# Patient Record
Sex: FEMALE | NOT HISPANIC OR LATINO | Employment: OTHER | URBAN - NONMETROPOLITAN AREA
[De-identification: names, ages, dates, MRNs, and addresses within clinical notes are randomized per-mention and may not be internally consistent; named-entity substitution may affect disease eponyms.]

---

## 2017-02-13 ENCOUNTER — OFFICE VISIT - GICH (OUTPATIENT)
Dept: FAMILY MEDICINE | Facility: OTHER | Age: 62
End: 2017-02-13

## 2017-02-13 ENCOUNTER — HISTORY (OUTPATIENT)
Dept: FAMILY MEDICINE | Facility: OTHER | Age: 62
End: 2017-02-13

## 2017-02-13 DIAGNOSIS — R30.0 DYSURIA: ICD-10-CM

## 2017-02-13 DIAGNOSIS — N30.01 ACUTE CYSTITIS WITH HEMATURIA: ICD-10-CM

## 2017-02-13 LAB
BACTERIA URINE: NORMAL BACTERIA/HPF
BILIRUB UR QL: NEGATIVE
CLARITY, URINE: CLEAR CLARITY
COLOR UR: YELLOW COLOR
EPITHELIAL CELLS: NORMAL EPI/HPF
GLUCOSE URINE: NEGATIVE MG/DL
KETONES UR QL: NEGATIVE MG/DL
LEUKOCYTE ESTERASE URINE: NEGATIVE
NITRITE UR QL STRIP: NEGATIVE
OCCULT BLOOD,URINE - HISTORICAL: ABNORMAL
PH UR: 5 [PH]
PROTEIN QUALITATIVE,URINE - HISTORICAL: NEGATIVE MG/DL
RBC - HISTORICAL: NORMAL /HPF
SP GR UR STRIP: <=1.005
UROBILINOGEN,QUALITATIVE - HISTORICAL: NORMAL EU/DL
WBC - HISTORICAL: NORMAL /HPF

## 2018-01-03 NOTE — NURSING NOTE
Patient Information     Patient Name MRN Izzy Hooper [id number] Female 1955      Nursing Note by Theresa Church at 2017 12:00 PM     Author:  Theresa Church Service:  (none) Author Type:  NURS- Student Practical Nurse     Filed:  2017 12:35 PM Encounter Date:  2017 Status:  Signed     :  Theresa Church (NURS- Student Practical Nurse)            Patient presents with blood in urine starting 1 week ago. Patient states it itches and burns with urination. Theresa Church LPN .............2017  12:24 PM

## 2018-01-03 NOTE — PATIENT INSTRUCTIONS
Patient Information     Patient Name MRIzzy King [id number] Female 1955      Patient Instructions by Qian Null NP at 2017 12:00 PM     Author:  Qian Null NP Service:  (none) Author Type:  PHYS- Nurse Practitioner     Filed:  2017 12:39 PM Encounter Date:  2017 Status:  Signed     :  Qian Null NP (PHYS- Nurse Practitioner)            Bladder Infection   ________________________________________________________________________  KEY POINTS    A bladder infection, also called cystitis, is a type of urinary tract infection. Your urinary tract includes your kidneys, ureters, bladder, and urethra.    Your healthcare provider will likely prescribe an antibiotic medicine and medicine to help relieve burning and discomfort.    Follow the full course of treatment prescribed by your healthcare provider. If you are taking an antibiotic medicine, take all of it as prescribed, even if your symptoms are gone.  ________________________________________________________________________  What is a bladder infection?  A bladder infection, also called cystitis, is a type of urinary tract infection. The urinary tract includes your:    Kidneys, which make urine    Ureters, which are the tubes that carry urine from the kidneys to the bladder    Bladder, which stores urine    Urethra, which is the tube that drains urine from the bladder and out of the body  What is the cause?  Bladder infections are usually caused by bacteria. Normally there should be no bacteria in your urinary tract. Bacteria that cause infections in the urinary tract often spread from the rectum or vagina to the urethra and up into the bladder.  Bladder infections are more common in women because the urethra is short. The short urethra makes it easier for bacteria from the rectum or the genital area to reach the bladder. This can happen during sex. Young women often have bladder infections when they  have just started having sex. In older women, irritation and dryness of the vagina after menopause may increase the risk for bladder infections.  Bacteria may grow in the bladder if the flow of urine is blocked. For example, when a woman is pregnant, pressure from the baby can cause this problem. In men, an enlarged prostate may cause a blockage. Stones in the kidney or bladder can also cause blockage and infections.  If you have recently had a urinary catheter (for example, during surgery) or if you need to use a catheter every day, you are more likely to get bladder infections.  What are the symptoms?  The symptoms range from mild to severe. They may include:    Urinating more often    Feeling an urgent need to urinate or feeling that your bladder is always full    Pain or burning when you urinate    Pain in your lower belly, low back, or your side    Urine that smells bad    Urine that looks cloudy, reddish, or bloody    Leaking of urine  Fever and chills are more common with kidney infections, but can also happen with bladder infections.  How is it diagnosed?  Your healthcare provider will ask about your symptoms and medical history and examine you. You may have urine and blood tests.    If you have many bladder infections, you may need to have additional tests to see if there is a problem in the kidneys or urinary tract:    An intravenous pyelogram (IVP), which is a series of X-rays taken after your healthcare provider injects dye into your blood vessels to look for blockages in your kidneys and urinary tract    An ultrasound, which uses sound waves to show pictures of the kidneys and urinary tract  Men may have tests after just one infection because bladder infections are less common in men and may mean there are other problems.  How is it treated?  Your healthcare provider will likely prescribe an antibiotic and medicine to help relieve burning and discomfort. Prompt treatment of a bladder infection with  antibiotics usually relieves the symptoms in 1 to 2 days. If your infection has been causing symptoms for several days before treatment or if you have a fever, it may take longer to feel better.  It s important to get prompt treatment for bladder infections. If the infection is not treated, it can damage your kidneys and make you very sick. If the infection spreads to your blood, it can be life-threatening. If you are very sick, you may need to be in the hospital and get antibiotic medicine IV.  How can I take care of myself?    Drink plenty of water each day to flush your bladder and urinary tract unless your healthcare provider has told you to limit how much liquid you drink.    If you have a fever, ask your healthcare provider if you should take an NSAID or acetaminophen. Read the label and take as directed. Unless recommended by your healthcare provider, you should not take these medicines for more than 10 days.    Nonsteroidal anti-inflammatory medicines (NSAIDs), such as ibuprofen, naproxen, and aspirin, may cause stomach bleeding and other problems. These risks increase with age.    Acetaminophen may cause liver damage or other problems. Unless recommended by your provider, don't take more than 3000 milligrams (mg) in 24 hours. To make sure you don t take too much, check other medicines you take to see if they also contain acetaminophen. Ask your provider if you need to avoid drinking alcohol while taking this medicine.    Follow the full course of treatment prescribed by your healthcare provider. If you were prescribed an antibiotic medicine, take the antibiotics for as long as your healthcare provider prescribes, even if you feel better. If you stop taking the medicine too soon, you may not kill all of the bacteria and you may get sick again. If you have side effects from your medicine, talk to your healthcare provider.    Ask your provider:    How and when you will get your test results    How long it will  take to recover    If there are activities you should avoid and when you can return to your normal activities    How to take care of yourself at home    What symptoms or problems you should watch for and what to do if you have them    Make sure you know when you should come back for a checkup.  How can I help prevent bladder infection?  You may help prevent bladder infection if you:    Drink enough liquids to keep urine light yellow in color.    Drink a glass of cranberry juice each day. The juice should be real cranberry juice, not a cranberry-flavored drink.    Don t wait to go to the bathroom if you feel the need to urinate.    Practice safe sex:    Ask your healthcare provider which type of condom, diaphragm, or other birth control is right for you.    Urinate soon after sex.    Keep your genital area clean. If you want to have vaginal sex after anal sex, both partners should wash their genitals first.    Empty your bladder completely when you urinate.    Don t wear a wet bathing suit for long periods of time.  Also, if you are a woman:    If you often have bladder infections, keep a journal to see if they are related to sex. If they tend to happen after sex, your provider may prescribe medicine for you to take to help prevent infection.    Don t use irritating cosmetics or chemicals in your genital area. This includes, for example, strong soaps, feminine hygiene sprays, douches, scented tampons, sanitary napkins, or panty liners.    Keep your vaginal area clean. Wiping from front to back after using the toilet may help prevent infections. Use mild, unscented soap to wash your genital area gently each time you bathe or shower.    Wear underwear that is all cotton or has a cotton crotch. Pantyhose should also have a cotton crotch. Cotton absorbs moisture better than nylon. Change underwear and pantyhose every day.    During pregnancy, tell your healthcare provider if you have had urinary tract problems in the  past and let your provider know if you are having symptoms.    If you have reached menopause and are not taking estrogen, prescription estrogen vaginal cream may help prevent bladder infections.

## 2018-01-26 VITALS
HEART RATE: 60 BPM | TEMPERATURE: 97.2 F | BODY MASS INDEX: 31.83 KG/M2 | WEIGHT: 168.6 LBS | SYSTOLIC BLOOD PRESSURE: 145 MMHG | DIASTOLIC BLOOD PRESSURE: 82 MMHG | HEIGHT: 61 IN

## 2018-02-12 ENCOUNTER — DOCUMENTATION ONLY (OUTPATIENT)
Dept: FAMILY MEDICINE | Facility: OTHER | Age: 63
End: 2018-02-12

## 2018-05-05 ENCOUNTER — OFFICE VISIT (OUTPATIENT)
Dept: FAMILY MEDICINE | Facility: OTHER | Age: 63
End: 2018-05-05
Attending: FAMILY MEDICINE
Payer: COMMERCIAL

## 2018-05-05 VITALS
OXYGEN SATURATION: 96 % | SYSTOLIC BLOOD PRESSURE: 150 MMHG | HEIGHT: 62 IN | HEART RATE: 69 BPM | DIASTOLIC BLOOD PRESSURE: 102 MMHG | BODY MASS INDEX: 34.98 KG/M2 | WEIGHT: 190.1 LBS | RESPIRATION RATE: 16 BRPM

## 2018-05-05 DIAGNOSIS — S61.411A LACERATION OF RIGHT HAND, FOREIGN BODY PRESENCE UNSPECIFIED, INITIAL ENCOUNTER: Primary | ICD-10-CM

## 2018-05-05 PROCEDURE — G0463 HOSPITAL OUTPT CLINIC VISIT: HCPCS

## 2018-05-05 PROCEDURE — 12002 RPR S/N/AX/GEN/TRNK2.6-7.5CM: CPT | Performed by: FAMILY MEDICINE

## 2018-05-05 NOTE — PROGRESS NOTES
"  SUBJECTIVE:   Izzy Lopez is a 62 year old female who presents to clinic today for the following health issues: Laceration    HPI Comments: Patient arrives here for laceration.  She was scraping off paint the scraper slipped jamming into the base of her right wrist.  She is not complaining of any paresthesia or weakness in her hands.  She arrives here for sutures    Laceration           Patient Active Problem List    Diagnosis Date Noted     Diverticulosis of sigmoid colon 11/23/2015     Priority: Medium     Health care maintenance 11/17/2015     Priority: Medium     Obesity (BMI 30-39.9) 08/31/2015     Priority: Medium     Past Medical History:   Diagnosis Date     Hyperlipidemia     No Comments Provided      Past Surgical History:   Procedure Laterality Date     COLONOSCOPY      11/23/15,F/U 2025     HYSTERECTOMY TOTAL ABDOMINAL, BILATERAL SALPINGO-OOPHORECTOMY, COMBINED      No Comments Provided     LAPAROSCOPIC TUBAL LIGATION      No Comments Provided     TONSILLECTOMY      No Comments Provided     Social History     Social History Narrative    Preload  6/7/2013  Works doing private homecare.  Rides motorcycles.     No current outpatient prescriptions on file.     No Known Allergies    Review of Systems     OBJECTIVE:     BP (!) 150/102 (BP Location: Left arm, Patient Position: Sitting, Cuff Size: Adult Regular)  Pulse 69  Resp 16  Ht 5' 1.5\" (1.562 m)  Wt 190 lb 1.6 oz (86.2 kg)  SpO2 96%  Breastfeeding? No  BMI 35.34 kg/m2  Body mass index is 35.34 kg/(m^2).  Physical Exam   Constitutional: She appears well-developed.   Skin:   Patient has a laceration 3 cm in length at the base of her wrist.  This was gently and sterilely explored.  It does not extend down into any joint capsule or nerve.  I cannot visualize any tendons.       none     ASSESSMENT/PLAN:         1. Laceration of right hand, foreign body presence unspecified, initial encounter  Informed consent obtained.  Procedure and reason for " suture repair advised to patient.  Area was cleaned with both soaking and copious amounts of tap water.  About 5 minutes in total duration.  The area was then anesthetized with lidocaine.  Using 5 5-0 Ethilon sutures the laceration was closed.  Again prior to closing the wound was explored with a sterile Q-tip could not see any laceration into the joints.  Patient will have the sutures removed in 10 days.  - REPAIR SUPERFICIAL, WOUND BODY 2.6-7.5 CM      Isaiah Ley MD  [facility]

## 2018-05-05 NOTE — MR AVS SNAPSHOT
"              After Visit Summary   2018    Izzy Lopez    MRN: [id number]           Patient Information     Date Of Birth          1955        Visit Information        Provider Department      2018 11:45 AM Isaiah Ley MD [facility]        Today's Diagnoses     Laceration of right hand, foreign body presence unspecified, initial encounter    -  1       Follow-ups after your visit        Who to contact     If you have questions or need follow up information about today's clinic visit or your schedule please contact [facility] directly at [phone].  Normal or non-critical lab and imaging results will be communicated to you by [employer], letter or phone within 4 business days after the clinic has received the results. If you do not hear from us within 7 days, please contact the clinic through [employer] or phone. If you have a critical or abnormal lab result, we will notify you by phone as soon as possible.  Submit refill requests through [employer] or call your pharmacy and they will forward the refill request to us. Please allow 3 business days for your refill to be completed.          Additional Information About Your Visit        MyChart Information     [employer] lets you send messages to your doctor, view your test results, renew your prescriptions, schedule appointments and more. To sign up, go to [email or web address] . Click on \"Log in\" on the left side of the screen, which will take you to the Welcome page. Then click on \"Sign up Now\" on the right side of the page.     You will be asked to enter the access code listed below, as well as some personal information. Please follow the directions to create your username and password.     Your access code is: [email or web address]  Expires: 8/3/2018  2:06 PM     Your access code will  in 90 days. If you need help or a new code, please call your [place] clinic or [phone].        Care EveryWhere ID     " "This is your Care EveryWhere ID. This could be used by other organizations to access your [place] medical records  [id number]        Your Vitals Were     Pulse Respirations Height Pulse Oximetry Breastfeeding? BMI (Body Mass Index)    69 16 5' 1.5\" (1.562 m) 96% No 35.34 kg/m2       Blood Pressure from Last 3 Encounters:   05/05/18 (!) 150/102   02/13/17 145/82   09/08/15 126/66    Weight from Last 3 Encounters:   05/05/18 190 lb 1.6 oz (86.2 kg)   02/13/17 168 lb 9.6 oz (76.5 kg)   09/08/15 176 lb 9.6 oz (80.1 kg)              We Performed the Following     REPAIR SUPERFICIAL, WOUND BODY 2.6-7.5 CM        Primary Care Provider Fax #    Physician No Ref-Primary [phone]       No address on file        Equal Access to Services     [facility] : Hadii maurisio Alexander, waaxda lissette, qaybta kaalmada trip, jan coppola . So [facility] [phone].    ATENCIÓN: Si habla español, tiene a mcmillan disposición servicios gratuitos de asistencia lingüística. Llame al [phone].    We comply with applicable federal civil rights laws and [place] laws. We do not discriminate on the basis of race, color, national origin, age, disability, sex, sexual orientation, or gender identity.            Thank you!     Thank you for choosing [facility] AND [facility]  for your care. Our goal is always to provide you with excellent care. Hearing back from our patients is one way we can continue to improve our services. Please take a few minutes to complete the written survey that you may receive in the mail after your visit with us. Thank you!             Your Updated Medication List - Protect others around you: Learn how to safely use, store and throw away your medicines at www.disposemymeds.org.      Notice  As of 5/5/2018  2:06 PM    You have not been prescribed any medications.      "

## 2018-05-05 NOTE — NURSING NOTE
Patient present to clinic today with a laceration to her right wrist. DOI 5/5/18. Was cleaning her RV. And tool slipped and cut her.  Last Tdap was 9/30/14.  Calista Andrade CMA..............5/5/2018........12:26 PM

## 2018-05-05 NOTE — NURSING NOTE
The following medication was given:     MEDICATION: Lidocaine  ROUTE: SQ  SITE: Right Wrist  DOSE: 1ml  LOT #: [id number]  :  [employer]  EXPIRATION DATE:  02/01/2022  Calista Andrade CMA..............5/5/2018........3:18 PM      Prior to the start of the procedure and with procedural staff participation, I verbally confirmed the patient s identity using two indicators, relevant allergies, that the procedure was appropriate and matched the consent or emergent situation, and that the correct equipment/implants were available. Immediately prior to starting the procedure I conducted the Time Out with the procedural staff and re-confirmed the patient s name, procedure, and site/side. (The Joint Commission universal protocol was followed.)  Yes    Sedation (Moderate or Deep): None  Calista Andrade CMA..............5/5/2018........3:18 PM

## 2018-09-28 ENCOUNTER — OFFICE VISIT (OUTPATIENT)
Dept: FAMILY MEDICINE | Facility: OTHER | Age: 63
End: 2018-09-28
Attending: FAMILY MEDICINE
Payer: COMMERCIAL

## 2018-09-28 VITALS
BODY MASS INDEX: 32.08 KG/M2 | WEIGHT: 172.6 LBS | DIASTOLIC BLOOD PRESSURE: 78 MMHG | SYSTOLIC BLOOD PRESSURE: 136 MMHG | HEART RATE: 68 BPM

## 2018-09-28 DIAGNOSIS — L20.84 INTRINSIC ATOPIC DERMATITIS: Primary | ICD-10-CM

## 2018-09-28 DIAGNOSIS — L57.0 ACTINIC KERATOSIS: ICD-10-CM

## 2018-09-28 DIAGNOSIS — D23.5 BENIGN NEOPLASM OF SKIN OF TRUNK, EXCEPT SCROTUM: ICD-10-CM

## 2018-09-28 PROCEDURE — 17110 DESTRUCTION B9 LES UP TO 14: CPT | Performed by: FAMILY MEDICINE

## 2018-09-28 PROCEDURE — G0463 HOSPITAL OUTPT CLINIC VISIT: HCPCS

## 2018-09-28 PROCEDURE — 17000 DESTRUCT PREMALG LESION: CPT | Performed by: FAMILY MEDICINE

## 2018-09-28 PROCEDURE — 99213 OFFICE O/P EST LOW 20 MIN: CPT | Mod: 25 | Performed by: FAMILY MEDICINE

## 2018-09-28 PROCEDURE — G0463 HOSPITAL OUTPT CLINIC VISIT: HCPCS | Mod: 25

## 2018-09-28 RX ORDER — TRIAMCINOLONE ACETONIDE 5 MG/G
CREAM TOPICAL
Qty: 30 G | Refills: 3 | Status: SHIPPED | OUTPATIENT
Start: 2018-09-28 | End: 2019-04-11

## 2018-09-28 NOTE — PROGRESS NOTES
SUBJECTIVE:   Izzy Lopez is a 63 year old female who presents to clinic today for the following health issues: Rash    HPI Comments: Patient arrives here for rash on her back.  She also has couple lesions on her face one over her left eye went over her right.  Rash in the back is been going on for months.  Very itchy.  States that she is used over-the-counter Cortaid without any improvement.  She is also noticed some scaly areas on her face she would like looked at.  Patient would also like an ingrown toenail looked at        Patient Active Problem List    Diagnosis Date Noted     Intrinsic atopic dermatitis 2018     Priority: Medium     Diverticulosis of sigmoid colon 2015     Priority: Medium     Health care maintenance 2015     Priority: Medium     Obesity (BMI 30-39.9) 2015     Priority: Medium     Past Medical History:   Diagnosis Date     Hyperlipidemia     No Comments Provided      Past Surgical History:   Procedure Laterality Date     COLONOSCOPY      11/23/15,F/U      HYSTERECTOMY TOTAL ABDOMINAL, BILATERAL SALPINGO-OOPHORECTOMY, COMBINED      No Comments Provided     LAPAROSCOPIC TUBAL LIGATION      No Comments Provided     TONSILLECTOMY      No Comments Provided     Family History   Problem Relation Age of Onset     Cancer Mother 60     Cancer,lung     Other - See Comments Father 60     AAA, during repair     Other - See Comments Paternal Grandmother      GI Disease     Other - See Comments Paternal Grandfather      GI Disease     Breast Cancer Sister      Cancer-breast     Social History   Substance Use Topics     Smoking status: Never Smoker     Smokeless tobacco: Never Used     Alcohol use No       Review of Systems     OBJECTIVE:     /78 (BP Location: Right arm, Patient Position: Sitting)  Pulse 68  Wt 172 lb 9.6 oz (78.3 kg)  BMI 32.08 kg/m2  Body mass index is 32.08 kg/(m^2).  Physical Exam   Constitutional: She appears well-developed.    Musculoskeletal:   Ingrown toenail present on the right foot   Skin:   lichenification she also has 2 areas on the present on her backon.  Excoriations present.  She also has 2 areas present one area on above her right eye slightly lateral scaly the left lesion above her left eye on her upper lid slightly pigmented and oily in appearance       none     ASSESSMENT/PLAN:         1. Intrinsic atopic dermatitis    - triamcinolone (KENALOG) 0.5 % cream; Apply sparingly to affected area three times daily.  Dispense: 30 g; Refill: 3    2. Actinic keratosis    - DESTRUCT PREMALIGNANT LESION, FIRST    3. Benign neoplasm of skin of trunk, except scrotum  Also destroyed with cryo    Advised to follow-up concerning her ingrown toenail for removal      Isaiah Ley MD  [facility] AND \[facility]\""

## 2018-09-28 NOTE — MR AVS SNAPSHOT
After Visit Summary   9/28/2018    Izzy Lopez    MRN: [id number]           Patient Information     Date Of Birth          1955        Visit Information        Provider Department      9/28/2018 9:00 AM Isaiah Ley MD [facility]        Today's Diagnoses     Intrinsic atopic dermatitis    -  1    Actinic keratosis        Benign neoplasm of skin of trunk, except scrotum           Follow-ups after your visit        Your next 10 appointments already scheduled     Oct 04, 2018 11:15 AM CDT   Office Visit with Isaiah Ley MD,     [facility]    [address]  [place] [address] [phone]           Bring a current list of meds and any records pertaining to this visit. For Physicals, please bring immunization records and any forms needing to be filled out. Please arrive 10 minutes early to complete paperwork.              Who to contact     If you have questions or need follow up information about today's clinic visit or your schedule please contact [facility] directly at [phone].  Normal or non-critical lab and imaging results will be communicated to you by MyChart, letter or phone within 4 business days after the clinic has received the results. If you do not hear from us within 7 days, please contact the clinic through MyChart or phone. If you have a critical or abnormal lab result, we will notify you by phone as soon as possible.  Submit refill requests through [employer] or call your pharmacy and they will forward the refill request to us. Please allow 3 business days for your refill to be completed.          Additional Information About Your Visit        Care EveryWhere ID     This is your Care EveryWhere ID. This could be used by other organizations to access your [place] medical records  [id number]        Your Vitals Were     Pulse BMI (Body Mass Index)                 68 32.08 kg/m2           Blood Pressure from Last 3 Encounters:   09/28/18 136/78   05/05/18 (!) 150/102   02/13/17 145/82    Weight from Last 3 Encounters:   09/28/18 172 lb 9.6 oz (78.3 kg)   05/05/18 190 lb 1.6 oz (86.2 kg)   02/13/17 168 lb 9.6 oz (76.5 kg)              We Performed the Following     DESTRUCT PREMALIGNANT LESION, FIRST          Today's Medication Changes          These changes are accurate as of 9/28/18 10:31 AM.  If you have any questions, ask your nurse or doctor.               Start taking these medicines.        Dose/Directions    triamcinolone 0.5 % cream   Commonly known as:  KENALOG   Used for:  Intrinsic atopic dermatitis   Started by:  Isaiah Ley MD        Apply sparingly to affected area three times daily.   Quantity:  30 g   Refills:  3            Where to get your medicines      These medications were sent to [facility] [address] [place] [id number]     Phone:  [phone]     triamcinolone 0.5 % cream                Primary Care Provider Fax #    Physician No Ref-Primary [phone]       No address on file        Equal Access to Services     SUZANNE MENDEZ AH: Luis Angel gaineso Sobambi, waaxda luqadaha, qaybta kaalmada adeegyada, jan marshall. So [facility] [phone].    ATENCIÓN: Si habla español, tiene a mcmillan disposición servicios gratuitos de asistencia lingüística. Llame al [phone].    We comply with applicable federal civil rights laws and [place] laws. We do not discriminate on the basis of race, color, national origin, age, disability, sex, sexual orientation, or gender identity.            Thank you!     Thank you for choosing [facility] AND [facility]  for your care. Our goal is always to provide you with excellent care. Hearing back from our patients is one way we can continue to improve our services. Please take a few minutes to complete  the written survey that you may receive in the mail after your visit with us. Thank you!             Your Updated Medication List - Protect others around you: Learn how to safely use, store and throw away your medicines at www.disposemymeds.org.          This list is accurate as of 9/28/18 10:31 AM.  Always use your most recent med list.                   Brand Name Dispense Instructions for use Diagnosis    triamcinolone 0.5 % cream    KENALOG    30 g    Apply sparingly to affected area three times daily.    Intrinsic atopic dermatitis

## 2018-09-28 NOTE — NURSING NOTE
Patient here for rash on her back for the past 3 weeks that itches. Started out small and has grown, she has 3 dry spots on her face and an ingrown left toenail. Kary Warren LPN .......................9/28/2018  8:57 AM

## 2018-10-04 ENCOUNTER — OFFICE VISIT (OUTPATIENT)
Dept: FAMILY MEDICINE | Facility: OTHER | Age: 63
End: 2018-10-04
Attending: FAMILY MEDICINE
Payer: COMMERCIAL

## 2018-10-04 VITALS
BODY MASS INDEX: 31.97 KG/M2 | DIASTOLIC BLOOD PRESSURE: 60 MMHG | WEIGHT: 172 LBS | SYSTOLIC BLOOD PRESSURE: 94 MMHG | HEART RATE: 64 BPM

## 2018-10-04 DIAGNOSIS — L60.0 INGROWN TOENAIL: Primary | ICD-10-CM

## 2018-10-04 PROCEDURE — 11730 AVULSION NAIL PLATE SIMPLE 1: CPT | Performed by: FAMILY MEDICINE

## 2018-10-04 PROCEDURE — G0463 HOSPITAL OUTPT CLINIC VISIT: HCPCS

## 2018-10-04 ASSESSMENT — PAIN SCALES - GENERAL: PAINLEVEL: NO PAIN (0)

## 2018-10-04 NOTE — MR AVS SNAPSHOT
After Visit Summary   10/4/2018    Izzy Lopez    MRN: [id number]           Patient Information     Date Of Birth          1955        Visit Information        Provider Department      10/4/2018 11:15 AM Isaiah Ley MD; [facility] [address] [facility]        Today's Diagnoses     Ingrown toenail    -  1       Follow-ups after your visit        Who to contact     If you have questions or need follow up information about today's clinic visit or your schedule please contact [facility] directly at [phone].  Normal or non-critical lab and imaging results will be communicated to you by MyChart, letter or phone within 4 business days after the clinic has received the results. If you do not hear from us within 7 days, please contact the clinic through MyChart or phone. If you have a critical or abnormal lab result, we will notify you by phone as soon as possible.  Submit refill requests through [employer] or call your pharmacy and they will forward the refill request to us. Please allow 3 business days for your refill to be completed.          Additional Information About Your Visit        Care EveryWhere ID     This is your Care EveryWhere ID. This could be used by other organizations to access your [facility] medical records  [id number]        Your Vitals Were     Pulse BMI (Body Mass Index)                64 31.97 kg/m2           Blood Pressure from Last 3 Encounters:   10/04/18 94/60   09/28/18 136/78   05/05/18 (!) 150/102    Weight from Last 3 Encounters:   10/04/18 172 lb (78 kg)   09/28/18 172 lb 9.6 oz (78.3 kg)   05/05/18 190 lb 1.6 oz (86.2 kg)              We Performed the Following     REMOVAL OF NAIL PLATE SIMPLE SINGLE        Primary Care Provider Fax #    Physician No Ref-Primary [phone]       No address on file        Equal Access to Services     SUZANNE MENDEZ : eliza Mireles qaybta kaalmada adeegyada, waxay  cynthia chavarrianarendra cassidy'aan ah. So [facility] [phone].    ATENCIÓN: Si mattla delmer, tiene a mcmillan disposición servicios gratuitos de asistencia lingüística. Nicola al [phone].    We comply with applicable federal civil rights laws and [place] laws. We do not discriminate on the basis of race, color, national origin, age, disability, sex, sexual orientation, or gender identity.            Thank you!     Thank you for choosing [facility] AND [facility]  for your care. Our goal is always to provide you with excellent care. Hearing back from our patients is one way we can continue to improve our services. Please take a few minutes to complete the written survey that you may receive in the mail after your visit with us. Thank you!             Your Updated Medication List - Protect others around you: Learn how to safely use, store and throw away your medicines at www.disposemymeds.org.          This list is accurate as of 10/4/18 11:47 AM.  Always use your most recent med list.                   Brand Name Dispense Instructions for use Diagnosis    triamcinolone 0.5 % cream    KENALOG    30 g    Apply sparingly to affected area three times daily.    Intrinsic atopic dermatitis

## 2018-10-04 NOTE — NURSING NOTE
Patient here for left great toenail removal.  Kary Warren LPN .......................10/4/2018  10:58 AM

## 2018-10-04 NOTE — PROGRESS NOTES
SUBJECTIVE:   Izzy Lopez is a 63 year old female who presents to clinic today for the following health issues: Ingrown toenail    HPI Comments: Patient arrives here for ingrown toenail removal.  She was recently seen and this was brought up.  She was advised to follow-up.         Patient Active Problem List    Diagnosis Date Noted     Ingrown toenail 10/04/2018     Priority: Medium     Intrinsic atopic dermatitis 09/28/2018     Priority: Medium     Actinic keratosis 09/28/2018     Priority: Medium     Benign neoplasm of skin of trunk, except scrotum 09/28/2018     Priority: Medium     Diverticulosis of sigmoid colon 11/23/2015     Priority: Medium     Health care maintenance 11/17/2015     Priority: Medium     Obesity (BMI 30-39.9) 08/31/2015     Priority: Medium     Past Medical History:   Diagnosis Date     Hyperlipidemia     No Comments Provided      Past Surgical History:   Procedure Laterality Date     COLONOSCOPY      11/23/15,F/U 2025     HYSTERECTOMY TOTAL ABDOMINAL, BILATERAL SALPINGO-OOPHORECTOMY, COMBINED      No Comments Provided     LAPAROSCOPIC TUBAL LIGATION      No Comments Provided     TONSILLECTOMY      No Comments Provided     Current Outpatient Prescriptions   Medication Sig Dispense Refill     triamcinolone (KENALOG) 0.5 % cream Apply sparingly to affected area three times daily. 30 g 3     No Known Allergies    Review of Systems     OBJECTIVE:     BP 94/60 (BP Location: Right arm, Patient Position: Sitting)  Pulse 64  Wt 172 lb (78 kg)  BMI 31.97 kg/m2  Body mass index is 31.97 kg/(m^2).  Physical Exam   Constitutional: She appears well-developed and well-nourished.   Musculoskeletal: Normal range of motion.   Ingrown toenail on the left first digit.  No secondary infection   Psychiatric: She has a normal mood and affect.       none     ASSESSMENT/PLAN:         1. Ingrown toenail left    - REMOVAL OF NAIL PLATE SIMPLE SINGLE    Informed consent obtained.  Form is filled out.   Patient's identity confirmed procedure confirmed.  Patient is agreeable.  Area was prepped with Hibiclens.  Using a digital block with 1% lidocaine without epi approximately 3 cc were injected into the using a [place] blade of the ingrown portion was excised left great toe..   was used and the nail was grabbed by a forcep.  Phenyl was applied x2 neutralized with alcohol x3.  Patient tolerated it well.  Follow-up as needed      Isaiah Ley MD  [facility] AND [facility]

## 2019-04-11 ENCOUNTER — OFFICE VISIT (OUTPATIENT)
Dept: FAMILY MEDICINE | Facility: OTHER | Age: 64
End: 2019-04-11
Attending: FAMILY MEDICINE
Payer: COMMERCIAL

## 2019-04-11 ENCOUNTER — HOSPITAL ENCOUNTER (OUTPATIENT)
Dept: GENERAL RADIOLOGY | Facility: OTHER | Age: 64
Discharge: HOME | End: 2019-04-11
Attending: FAMILY MEDICINE | Admitting: FAMILY MEDICINE
Payer: COMMERCIAL

## 2019-04-11 VITALS
SYSTOLIC BLOOD PRESSURE: 128 MMHG | DIASTOLIC BLOOD PRESSURE: 74 MMHG | BODY MASS INDEX: 32.64 KG/M2 | TEMPERATURE: 97.7 F | WEIGHT: 175.6 LBS | RESPIRATION RATE: 20 BRPM | HEART RATE: 76 BPM

## 2019-04-11 DIAGNOSIS — M54.41 CHRONIC BILATERAL LOW BACK PAIN WITH RIGHT-SIDED SCIATICA: ICD-10-CM

## 2019-04-11 DIAGNOSIS — M54.41 CHRONIC BILATERAL LOW BACK PAIN WITH RIGHT-SIDED SCIATICA: Primary | ICD-10-CM

## 2019-04-11 DIAGNOSIS — G89.29 CHRONIC BILATERAL LOW BACK PAIN WITH RIGHT-SIDED SCIATICA: ICD-10-CM

## 2019-04-11 DIAGNOSIS — G89.29 CHRONIC BILATERAL LOW BACK PAIN WITH RIGHT-SIDED SCIATICA: Primary | ICD-10-CM

## 2019-04-11 PROBLEM — M54.40 CHRONIC BILATERAL LOW BACK PAIN WITH SCIATICA: Status: ACTIVE | Noted: 2019-04-11

## 2019-04-11 PROBLEM — M54.42 CHRONIC BILATERAL LOW BACK PAIN WITH SCIATICA: Status: ACTIVE | Noted: 2019-04-11

## 2019-04-11 LAB
ANION GAP SERPL CALCULATED.3IONS-SCNC: 3 MMOL/L (ref 3–14)
BUN SERPL-MCNC: 24 MG/DL (ref 7–25)
CALCIUM SERPL-MCNC: 9.9 MG/DL (ref 8.6–10.3)
CHLORIDE SERPL-SCNC: 109 MMOL/L (ref 98–107)
CO2 SERPL-SCNC: 26 MMOL/L (ref 21–31)
CREAT SERPL-MCNC: 0.92 MG/DL (ref 0.6–1.2)
GFR SERPL CREATININE-BSD FRML MDRD: 61 ML/MIN/{1.73_M2}
GLUCOSE SERPL-MCNC: 98 MG/DL (ref 70–105)
POTASSIUM SERPL-SCNC: 4.5 MMOL/L (ref 3.5–5.1)
SODIUM SERPL-SCNC: 138 MMOL/L (ref 134–144)

## 2019-04-11 PROCEDURE — G0463 HOSPITAL OUTPT CLINIC VISIT: HCPCS | Mod: 25

## 2019-04-11 PROCEDURE — 80048 BASIC METABOLIC PNL TOTAL CA: CPT | Performed by: FAMILY MEDICINE

## 2019-04-11 PROCEDURE — G0463 HOSPITAL OUTPT CLINIC VISIT: HCPCS

## 2019-04-11 PROCEDURE — 72100 X-RAY EXAM L-S SPINE 2/3 VWS: CPT

## 2019-04-11 PROCEDURE — 99213 OFFICE O/P EST LOW 20 MIN: CPT | Performed by: FAMILY MEDICINE

## 2019-04-11 PROCEDURE — 36415 COLL VENOUS BLD VENIPUNCTURE: CPT | Performed by: FAMILY MEDICINE

## 2019-04-11 RX ORDER — MELOXICAM 15 MG/1
15 TABLET ORAL DAILY
Qty: 30 TABLET | Refills: 3 | Status: SHIPPED | OUTPATIENT
Start: 2019-04-11 | End: 2019-08-12

## 2019-04-11 ASSESSMENT — PATIENT HEALTH QUESTIONNAIRE - PHQ9: SUM OF ALL RESPONSES TO PHQ QUESTIONS 1-9: 0

## 2019-04-11 ASSESSMENT — PAIN SCALES - GENERAL: PAINLEVEL: SEVERE PAIN (7)

## 2019-04-11 NOTE — NURSING NOTE
Patient here for low back pain that started 2 months ago that radiated around to the right groin area. Medication Reconciliation: complete.    Kary Warren LPN  4/11/2019 8:22 AM

## 2019-04-11 NOTE — LETTER
April 12, 2019      Izzy Lopez  [address] [address] [address]        Dear ,    We are writing to inform you of your test results.    Your test results fall within the expected range(s) or remain unchanged from previous results.  Please continue with current treatment plan.    Resulted Orders   Basic Metabolic Panel   Result Value Ref Range    Sodium 138 134 - 144 mmol/L    Potassium 4.5 3.5 - 5.1 mmol/L    Chloride 109 (H) 98 - 107 mmol/L    Carbon Dioxide 26 21 - 31 mmol/L    Anion Gap 3 3 - 14 mmol/L    Glucose 98 70 - 105 mg/dL    Urea Nitrogen 24 7 - 25 mg/dL    Creatinine 0.92 0.60 - 1.20 mg/dL    GFR Estimate 61 >60 mL/min/[1.73_m2]    GFR Estimate If Black 74 >60 mL/min/[1.73_m2]    Calcium 9.9 8.6 - 10.3 mg/dL       If you have any questions or concerns, please call the clinic at the number listed above.       Sincerely,        Isaiah Ley MD

## 2019-04-12 NOTE — PROGRESS NOTES
SUBJECTIVE:   Izzy Lopez is a 63 year old female who presents to clinic today for the following health issues: 2-month history of back pain    Patient arrives here for back pain.  States is been going on for 2 months.  He radiates into the groin from the lower back.  She is been told in the past she has arthritis of the back.  She has no history of injury.  This has occurred in the past but states that typically it resolves on its own.  This is been lasting for 2 months.  She rates it as a 7 out of 10.  She has been taken ibuprofen ice and heat without any improvement.  Also reports it goes down into the right buttocks and occasionally down the legs.  She denies any weakness in the legs.  The pain is mainly anterior.        Patient Active Problem List    Diagnosis Date Noted     Chronic bilateral low back pain with sciatica 04/11/2019     Priority: Medium     Ingrown toenail 10/04/2018     Priority: Medium     Intrinsic atopic dermatitis 09/28/2018     Priority: Medium     Actinic keratosis 09/28/2018     Priority: Medium     Benign neoplasm of skin of trunk, except scrotum 09/28/2018     Priority: Medium     Diverticulosis of sigmoid colon 11/23/2015     Priority: Medium     Health care maintenance 11/17/2015     Priority: Medium     Obesity (BMI 30-39.9) 08/31/2015     Priority: Medium     Past Medical History:   Diagnosis Date     Hyperlipidemia     No Comments Provided      Past Surgical History:   Procedure Laterality Date     COLONOSCOPY  11/23/2015    11/23/15,F/U 2025     HYSTERECTOMY TOTAL ABDOMINAL, BILATERAL SALPINGO-OOPHORECTOMY, COMBINED      No Comments Provided     LAPAROSCOPIC TUBAL LIGATION      No Comments Provided     TONSILLECTOMY      No Comments Provided     Current Outpatient Medications   Medication Sig Dispense Refill     meloxicam (MOBIC) 15 MG tablet Take 1 tablet (15 mg) by mouth daily 30 tablet 3     No Known Allergies    Review of Systems     OBJECTIVE:     /74 (BP  Location: Right arm)   Pulse 76   Temp 97.7  F (36.5  C)   Resp 20   Wt 79.7 kg (175 lb 9.6 oz)   BMI 32.64 kg/m    Body mass index is 32.64 kg/m .  Physical Exam   Constitutional: She appears well-developed.   HENT:   Head: Normocephalic.   Musculoskeletal: Normal range of motion. She exhibits no deformity.   Neurological: She displays normal reflexes. She exhibits normal muscle tone. Coordination normal.   Patient is able to stand on her toes and heels squats without difficulties.   Skin: Skin is warm.       Diagnostic Test Results:  Results for orders placed or performed in visit on 04/11/19   Basic Metabolic Panel   Result Value Ref Range    Sodium 138 134 - 144 mmol/L    Potassium 4.5 3.5 - 5.1 mmol/L    Chloride 109 (H) 98 - 107 mmol/L    Carbon Dioxide 26 21 - 31 mmol/L    Anion Gap 3 3 - 14 mmol/L    Glucose 98 70 - 105 mg/dL    Urea Nitrogen 24 7 - 25 mg/dL    Creatinine 0.92 0.60 - 1.20 mg/dL    GFR Estimate 61 >60 mL/min/[1.73_m2]    GFR Estimate If Black 74 >60 mL/min/[1.73_m2]    Calcium 9.9 8.6 - 10.3 mg/dL         ASSESSMENT/PLAN:         1. Chronic bilateral low back pain with right-sided sciatica  X-rays do show scoliosis and mild degenerative changes.  Patient has satisfactory kidney function.  We will start her on Mobic.  If no improvement consider physical therapy.  Also had a long discussion with the patient about pursuing other modalities including injection which would need an MRI.  We will treat conservative for now.  - XR Lumbar Spine 2/3 Views; Future  - meloxicam (MOBIC) 15 MG tablet; Take 1 tablet (15 mg) by mouth daily  Dispense: 30 tablet; Refill: 3  - Basic Metabolic Panel; Future  - Basic Metabolic Panel      Isaiah Ley MD  [facility]

## 2019-08-12 DIAGNOSIS — G89.29 CHRONIC BILATERAL LOW BACK PAIN WITH RIGHT-SIDED SCIATICA: Primary | ICD-10-CM

## 2019-08-12 DIAGNOSIS — M54.41 CHRONIC BILATERAL LOW BACK PAIN WITH RIGHT-SIDED SCIATICA: Primary | ICD-10-CM

## 2019-08-14 RX ORDER — MELOXICAM 15 MG/1
TABLET ORAL
Qty: 30 TABLET | Refills: 5 | Status: SHIPPED | OUTPATIENT
Start: 2019-08-14 | End: 2022-12-14

## 2019-08-14 NOTE — TELEPHONE ENCOUNTER
[facility] sent Rx request for the following:      MELOXICAM 15 MG TABLET   Sig: TAKE 1 TABLET BY MOUTH ONCE DAILY  Last Prescription Date:   4/11/19  Last Fill Qty/Refills:         30, R-3    Last Office Visit:              4/11/19  Future Office visit:           None.    NSAID Medications Failed8/14 11:19 AM   Normal ALT on file in past 12 months    Normal AST on file in past 12 months    Patient is age 6-64 years    Normal CBC on file in past 12 months     Per LOV Note:  We will start her on Mobic.  If no improvement consider physical therapy.  Also had a long discussion with the patient about pursuing other modalities including injection which would need an MRI.  We will treat conservative for now.    Attempted reaching Patient for more information. Left message on machine to call back. Mariam Luicano RN .............. 8/14/2019  12:23 PM

## 2019-08-14 NOTE — TELEPHONE ENCOUNTER
"Patient returned call and her last name and  were verified. Pt states, \"If I forget to take this for a couple of days, I am in real pain. As long as I take it, I am pain free. It is working great for me. I don't even want to know if there are side effects.\"    Pt notes she just turned 64 and only has medicare (without supplement). She cannot afford to come in for follow-up appointment at this time.    Routing to PCP for refill consideration. Mariam Luciano RN .............. 2019  12:39 PM        "

## 2020-05-04 NOTE — PROGRESS NOTES
MEDICARE WELLNESS VISIT NOTE      HISTORY OF PRESENT ILLNESS:   Viola Jose presents for her Subsequent Annual Medicare Wellness Visit.   She has no current complaints or concerns.     Needs meds refilled.   Doing well. No concerns.  Mood is good. No depression or anxiety.  Tolerating pravastatin without side effects.    She is due for annual lab work.     Patient Care Team:  Abbi Gutierres DO as PCP - General (Family Practice)  jonatan (Dentist - General Practice)  Mo Lion MD (Psychiatry)  Maximiliano Nguyen MD (Dermatology - [facility]-Micrographic Surgery)  Hamilton Azar MD (Obstetrics & Gynecology)        Patient Active Problem List   Diagnosis   • Other and unspecified hyperlipidemia   • Palpitations   • Wrist joint pain   • Enthesopathy of hip region   • Depression         Past Medical History:   Diagnosis Date   • Colon polyps    • Depressive disorder    • Hx of migraines    • Malignant neoplasm (CMS/HCC)     basil cell   • Other and unspecified hyperlipidemia          Past Surgical History:   Procedure Laterality Date   • Eye surgery      lasix   • Incise finger tendon sheath  03/13/2008   • Service to gastroenterology      colonoscopy   • Skin biopsy     • [facility] tooth extraction           Social History     Tobacco Use   • Smoking status: Never Smoker   • Smokeless tobacco: Never Used   Substance Use Topics   • Alcohol use: No     Comment: rarely   • Drug use: No     Drug use:    Drug Use:    No              Family History - Reviewed    Current Outpatient Medications   Medication Sig Dispense Refill   • sertraline (ZOLOFT) 100 MG tablet Take 1 tablet by mouth daily. 90 tablet 3   • pravastatin (PRAVACHOL) 40 MG tablet Take 1 tablet by mouth at bedtime. 90 tablet 3   • Calcium Carbonate-Vitamin D (CALCIUM 600+D) 600-200 MG-UNIT TABS Take 1 tablet by mouth 3 times daily.       No current facility-administered medications for this visit.         The following items on the Medicare Health Risk  "Patient Information     Patient Name MRN Izzy Hooper [id number] Female 1955      Progress Notes by Qian Null NP at 2017 12:00 PM     Author:  Qian Null NP Service:  (none) Author Type:  PHYS- Nurse Practitioner     Filed:  2017  4:26 PM Encounter Date:  2017 Status:  Signed     :  Qian Null NP (PHYS- Nurse Practitioner)            Nursing Notes:   Theresa Church  2017 12:35 PM  Signed  Patient presents with blood in urine starting 1 week ago. Patient states it itches and burns with urination. Theresa Church LPN .............2017  12:24 PM    SUBJECTIVE:    Izzy DUNCAN is a 61 y.o. female who presents for UTI s/s    Dysuria    This is a new problem. The current episode started in the past 7 days. The problem occurs every urination. The problem has been unchanged. The quality of the pain is described as burning, stabbing and aching. There has been no fever. She is sexually active. There is no history of pyelonephritis. Associated symptoms include frequency, hematuria and urgency. Pertinent negatives include no chills, discharge, flank pain, hesitancy, nausea, possible pregnancy, sweats or vomiting. She has tried increased fluids for the symptoms. The treatment provided mild relief. There is no history of catheterization, kidney stones, recurrent UTIs, a single kidney, urinary stasis or a urological procedure.       No current outpatient prescriptions on file prior to visit.     No current facility-administered medications on file prior to visit.        REVIEW OF SYSTEMS:  Review of Systems   Constitutional: Negative for chills.   Gastrointestinal: Negative for nausea and vomiting.   Genitourinary: Positive for dysuria, frequency, hematuria and urgency. Negative for flank pain and hesitancy.       OBJECTIVE:  /82  Pulse 60  Temp 97.2  F (36.2  C) (Temporal)  Ht 1.55 m (5' 1.02\")  Wt 76.5 kg (168 lb 9.6 oz)  " Assessment were found to be positive  6 c.) How many servings of Fried or High Fat Foods do you have each day (1 serving = 1 Lara, French Fries, chips, doughnut, fried chicken/fish):  2 per day         Vision and Hearing screens: not performed    Advance Directive:   The patient has the following documents:  Power of  for Health Care    Cognitive Assessment: no evidence of cognitive dysfunction by direct observation    Recent PHQ 2/9 Score    PHQ 2:  Date Adult PHQ 2 Score   4/12/2019 0       PHQ 9:  Date Adult PHQ 9 Score   2/6/2018 0       DEPRESSION ASSESSMENT/PLAN:  Depression screening is negative no further plan needed.     Body mass index is 24.07 kg/m².    BMI ASSESSMENT/PLAN:  Patient BMI is within normal range.     Needed Screening/Treatment:   Cardiovascular screening - Lipids , Diabetes screening  and Immunizations reviewed and patient needs: Herpes Zoster  Needed follow up:  None    See orders.   See Patient Instructions section.   Return in about 1 year (around 5/4/2021) for Medicare Wellness Visit.   Breastfeeding? No  BMI 31.83 kg/m2    EXAM:   Physical Exam   Constitutional: She is well-developed, well-nourished, and in no distress.   HENT:   Head: Normocephalic and atraumatic.   Eyes: Conjunctivae are normal.   Cardiovascular: Normal rate.    Pulmonary/Chest: Effort normal. No respiratory distress.   Abdominal: Soft. Bowel sounds are normal. She exhibits no distension. There is no hepatosplenomegaly. There is tenderness in the suprapubic area. There is no rebound, no guarding and no CVA tenderness.   Nursing note and vitals reviewed.    Results for orders placed or performed in visit on 02/13/17      URINALYSIS W REFLEX MICROSCOPIC IF POSITIVE      Result  Value Ref Range    COLOR                     Yellow Yellow Color    CLARITY                   Clear Clear Clarity    SPECIFIC GRAVITY,URINE    <=1.005 (A) 1.010, 1.015, 1.020, 1.025                    PH,URINE                  5.0 (A) 6.0, 7.0, 8.0, 5.5, 6.5, 7.5, 8.5                    UROBILINOGEN,QUALITATIVE  Normal Normal EU/dl    PROTEIN, URINE Negative Negative mg/dL    GLUCOSE, URINE Negative Negative mg/dL    KETONES,URINE             Negative Negative mg/dL    BILIRUBIN,URINE           Negative Negative                    OCCULT BLOOD,URINE        Trace (A) Negative                    NITRITE                   Negative Negative                    LEUKOCYTE ESTERASE        Negative Negative                   URINALYSIS MICROSCOPIC      Result  Value Ref Range    RBC 0-2 0-2, None Seen /HPF    WBC None Seen 0-2, 3-5, None Seen /HPF    BACTERIA                  None Seen None Seen, Rare, Occasional, Few Bacteria/HPF    EPITHELIAL CELLS          None Seen None Seen, Few Epi/HPF     Completed labs at today's visit U/A.  I personally reviewed the labs with the patient/parent at the visit. Abnormalities include Dilute U/A, trace blood.     ASSESSMENT/PLAN:    ICD-10-CM    1. Dysuria R30.0 URINALYSIS W REFLEX MICROSCOPIC IF POSITIVE      URINALYSIS W  REFLEX MICROSCOPIC IF POSITIVE      URINALYSIS MICROSCOPIC      URINALYSIS MICROSCOPIC   2. Acute cystitis with hematuria N30.01 nitrofurantoin macrocrystals/monohydrate (MACROBID) 100 mg capsule        Plan:  Based on Clinic s/s and exam will treat as clinical UTI. I explained my diagnostic considerations and recommendations to the patient, who voiced understanding and agreement with the treatment plan. All questions were answered. We discussed potential side effects of any prescribed or recommended therapies, as well as expectations for response to treatments. She was advised to contact our office if there is no improvement or worsening of conditions or symptoms.  If s/s worsen or persist, patient will either come back or follow up with PCP.       MARIANNE GARCIA NP ....................  2/13/2017   4:26 PM

## 2021-04-23 ENCOUNTER — IMMUNIZATION (OUTPATIENT)
Dept: FAMILY MEDICINE | Facility: OTHER | Age: 66
End: 2021-04-23
Attending: FAMILY MEDICINE
Payer: MEDICARE

## 2021-04-23 PROCEDURE — 91300 PR COVID VAC PFIZER DIL RECON 30 MCG/0.3 ML IM: CPT

## 2021-05-14 ENCOUNTER — IMMUNIZATION (OUTPATIENT)
Dept: FAMILY MEDICINE | Facility: OTHER | Age: 66
End: 2021-05-14
Attending: FAMILY MEDICINE
Payer: MEDICARE

## 2021-05-14 PROCEDURE — 91300 PR COVID VAC PFIZER DIL RECON 30 MCG/0.3 ML IM: CPT

## 2022-12-14 ENCOUNTER — OFFICE VISIT (OUTPATIENT)
Dept: FAMILY MEDICINE | Facility: OTHER | Age: 67
End: 2022-12-14
Attending: FAMILY MEDICINE
Payer: MEDICARE

## 2022-12-14 ENCOUNTER — HOSPITAL ENCOUNTER (OUTPATIENT)
Dept: GENERAL RADIOLOGY | Facility: OTHER | Age: 67
Discharge: HOME | End: 2022-12-14
Attending: FAMILY MEDICINE
Payer: MEDICARE

## 2022-12-14 VITALS
DIASTOLIC BLOOD PRESSURE: 84 MMHG | SYSTOLIC BLOOD PRESSURE: 120 MMHG | BODY MASS INDEX: 34.69 KG/M2 | RESPIRATION RATE: 18 BRPM | WEIGHT: 186.6 LBS | HEART RATE: 75 BPM | OXYGEN SATURATION: 96 % | TEMPERATURE: 97.6 F

## 2022-12-14 DIAGNOSIS — Z13.6 SCREENING FOR CARDIOVASCULAR CONDITION: ICD-10-CM

## 2022-12-14 DIAGNOSIS — R06.02 SOB (SHORTNESS OF BREATH): ICD-10-CM

## 2022-12-14 DIAGNOSIS — Z82.49 FAMILY HISTORY OF ABDOMINAL AORTIC ANEURYSM (AAA): ICD-10-CM

## 2022-12-14 DIAGNOSIS — L82.0 INFLAMED SEBORRHEIC KERATOSIS: ICD-10-CM

## 2022-12-14 DIAGNOSIS — Z12.31 ENCOUNTER FOR SCREENING MAMMOGRAM FOR MALIGNANT NEOPLASM OF BREAST: ICD-10-CM

## 2022-12-14 DIAGNOSIS — Z12.31 ENCOUNTER FOR SCREENING MAMMOGRAM FOR BREAST CANCER: ICD-10-CM

## 2022-12-14 DIAGNOSIS — E78.00 HYPERCHOLESTEREMIA: ICD-10-CM

## 2022-12-14 DIAGNOSIS — I70.0 ATHEROSCLEROSIS OF AORTA (H): ICD-10-CM

## 2022-12-14 DIAGNOSIS — R07.89 ATYPICAL CHEST PAIN: Primary | ICD-10-CM

## 2022-12-14 LAB
ALBUMIN SERPL BCG-MCNC: 4.5 G/DL (ref 3.5–5.2)
ALP SERPL-CCNC: 98 U/L (ref 35–104)
ALT SERPL W P-5'-P-CCNC: 24 U/L (ref 10–35)
ANION GAP SERPL CALCULATED.3IONS-SCNC: 11 MMOL/L (ref 7–15)
AST SERPL W P-5'-P-CCNC: 22 U/L (ref 10–35)
BASOPHILS # BLD AUTO: 0.1 10E3/UL (ref 0–0.2)
BASOPHILS NFR BLD AUTO: 1 %
BILIRUB SERPL-MCNC: 0.3 MG/DL
BUN SERPL-MCNC: 23.5 MG/DL (ref 8–23)
CALCIUM SERPL-MCNC: 9.6 MG/DL (ref 8.8–10.2)
CHLORIDE SERPL-SCNC: 100 MMOL/L (ref 98–107)
CHOLEST SERPL-MCNC: 291 MG/DL
CREAT SERPL-MCNC: 1.14 MG/DL (ref 0.51–0.95)
DEPRECATED HCO3 PLAS-SCNC: 24 MMOL/L (ref 22–29)
EOSINOPHIL # BLD AUTO: 0.1 10E3/UL (ref 0–0.7)
EOSINOPHIL NFR BLD AUTO: 1 %
ERYTHROCYTE [DISTWIDTH] IN BLOOD BY AUTOMATED COUNT: 12.5 % (ref 10–15)
GFR SERPL CREATININE-BSD FRML MDRD: 52 ML/MIN/1.73M2
GLUCOSE SERPL-MCNC: 94 MG/DL (ref 70–99)
HCT VFR BLD AUTO: 47.1 % (ref 35–47)
HDLC SERPL-MCNC: 54 MG/DL
HGB BLD-MCNC: 15.8 G/DL (ref 11.7–15.7)
IMM GRANULOCYTES # BLD: 0.1 10E3/UL
IMM GRANULOCYTES NFR BLD: 1 %
LDLC SERPL CALC-MCNC: 204 MG/DL
LYMPHOCYTES # BLD AUTO: 4.2 10E3/UL (ref 0.8–5.3)
LYMPHOCYTES NFR BLD AUTO: 36 %
MCH RBC QN AUTO: 30.5 PG (ref 26.5–33)
MCHC RBC AUTO-ENTMCNC: 33.5 G/DL (ref 31.5–36.5)
MCV RBC AUTO: 91 FL (ref 78–100)
MONOCYTES # BLD AUTO: 0.8 10E3/UL (ref 0–1.3)
MONOCYTES NFR BLD AUTO: 7 %
NEUTROPHILS # BLD AUTO: 6.4 10E3/UL (ref 1.6–8.3)
NEUTROPHILS NFR BLD AUTO: 54 %
NONHDLC SERPL-MCNC: 237 MG/DL
NRBC # BLD AUTO: 0 10E3/UL
NRBC BLD AUTO-RTO: 0 /100
PLATELET # BLD AUTO: 340 10E3/UL (ref 150–450)
POTASSIUM SERPL-SCNC: 5 MMOL/L (ref 3.4–5.3)
PROT SERPL-MCNC: 7.5 G/DL (ref 6.4–8.3)
RBC # BLD AUTO: 5.18 10E6/UL (ref 3.8–5.2)
SODIUM SERPL-SCNC: 135 MMOL/L (ref 136–145)
TRIGL SERPL-MCNC: 167 MG/DL
TROPONIN T SERPL HS-MCNC: 7 NG/L
WBC # BLD AUTO: 11.6 10E3/UL (ref 4–11)

## 2022-12-14 PROCEDURE — 93005 ELECTROCARDIOGRAM TRACING: CPT | Performed by: FAMILY MEDICINE

## 2022-12-14 PROCEDURE — 17110 DESTRUCTION B9 LES UP TO 14: CPT | Mod: XU | Performed by: FAMILY MEDICINE

## 2022-12-14 PROCEDURE — G0463 HOSPITAL OUTPT CLINIC VISIT: HCPCS | Mod: 25

## 2022-12-14 PROCEDURE — 71046 X-RAY EXAM CHEST 2 VIEWS: CPT

## 2022-12-14 PROCEDURE — G0463 HOSPITAL OUTPT CLINIC VISIT: HCPCS

## 2022-12-14 PROCEDURE — 84484 ASSAY OF TROPONIN QUANT: CPT | Mod: ZL | Performed by: FAMILY MEDICINE

## 2022-12-14 PROCEDURE — 80053 COMPREHEN METABOLIC PANEL: CPT | Mod: ZL | Performed by: FAMILY MEDICINE

## 2022-12-14 PROCEDURE — 36415 COLL VENOUS BLD VENIPUNCTURE: CPT | Mod: ZL | Performed by: FAMILY MEDICINE

## 2022-12-14 PROCEDURE — 99204 OFFICE O/P NEW MOD 45 MIN: CPT | Mod: 25 | Performed by: FAMILY MEDICINE

## 2022-12-14 PROCEDURE — 93010 ELECTROCARDIOGRAM REPORT: CPT | Performed by: INTERNAL MEDICINE

## 2022-12-14 PROCEDURE — 80061 LIPID PANEL: CPT | Mod: ZL | Performed by: FAMILY MEDICINE

## 2022-12-14 PROCEDURE — 85025 COMPLETE CBC W/AUTO DIFF WBC: CPT | Mod: ZL | Performed by: FAMILY MEDICINE

## 2022-12-14 RX ORDER — ATORVASTATIN CALCIUM 40 MG/1
40 TABLET, FILM COATED ORAL DAILY
Qty: 90 TABLET | Refills: 3 | Status: SHIPPED | OUTPATIENT
Start: 2022-12-14 | End: 2024-06-18

## 2022-12-14 ASSESSMENT — ANXIETY QUESTIONNAIRES
6. BECOMING EASILY ANNOYED OR IRRITABLE: NOT AT ALL
IF YOU CHECKED OFF ANY PROBLEMS ON THIS QUESTIONNAIRE, HOW DIFFICULT HAVE THESE PROBLEMS MADE IT FOR YOU TO DO YOUR WORK, TAKE CARE OF THINGS AT HOME, OR GET ALONG WITH OTHER PEOPLE: NOT DIFFICULT AT ALL
GAD7 TOTAL SCORE: 0
8. IF YOU CHECKED OFF ANY PROBLEMS, HOW DIFFICULT HAVE THESE MADE IT FOR YOU TO DO YOUR WORK, TAKE CARE OF THINGS AT HOME, OR GET ALONG WITH OTHER PEOPLE?: NOT DIFFICULT AT ALL
GAD7 TOTAL SCORE: 0
2. NOT BEING ABLE TO STOP OR CONTROL WORRYING: NOT AT ALL
4. TROUBLE RELAXING: NOT AT ALL
3. WORRYING TOO MUCH ABOUT DIFFERENT THINGS: NOT AT ALL
1. FEELING NERVOUS, ANXIOUS, OR ON EDGE: NOT AT ALL
7. FEELING AFRAID AS IF SOMETHING AWFUL MIGHT HAPPEN: NOT AT ALL
5. BEING SO RESTLESS THAT IT IS HARD TO SIT STILL: NOT AT ALL
7. FEELING AFRAID AS IF SOMETHING AWFUL MIGHT HAPPEN: NOT AT ALL

## 2022-12-14 ASSESSMENT — PAIN SCALES - GENERAL: PAINLEVEL: MODERATE PAIN (5)

## 2022-12-14 NOTE — PROGRESS NOTES
SUBJECTIVE:   Izzy Lopez is a 67 year old female who presents to clinic today for the following health issues: Multiple concerns    Patient arrives here with a list on her phone.  She has multiple concerns.  When discussed that we should address 3 of them she indicates she needs a 40-minute appointment to get all her concerns addressed.  She is concerned about #1 knee swelling and joint pain.  #2 leg cramps #3 shortness of breath #4 discoloration on her gums and in tongue which are peeling.  #5 ridges on her nails #6 a couple spots on her face #7 her jaws clinch 8 occasional chest pain 9 shortness of breath #10 snapping joints 11 shoulder pain 12 ingrown toenail 13 plantars wart.  Patient has the tongue.  During this exam she also brought up abruptly she is concerned about heart disease.  She reports that multiple uncles have had heart disease at a young age.  Her dad had a AAA repaired.  Recently had a brother who had heart disease and stenting.  She reports the chest pain in the right chest does come and go.  Can be reproduced with palpation but will also occur with activity along with shortness of breath.  She was recently she walked around to get the mail and developed quite a bit of shortness of breath.  This resolved after 5 minutes of rest..  Shortness of breath did improve.  Chest x-ray done this visit does show atherosclerosis of the ascending aorta.  She has a long history of slightly elevated LDL greater than 200.  In the past she has refused statins because of potential side effects.  She has never tried statins.    History of Present Illness       Reason for visit:  Knee swelling, joints,several issues    She eats 0-1 servings of fruits and vegetables daily.She consumes 0 sweetened beverage(s) daily.She exercises with enough effort to increase her heart rate 9 or less minutes per day.  She exercises with enough effort to increase her heart rate 3 or less days per week.   She is taking medications  regularly.  Today's MAGALY-7 Score: 0        Patient Active Problem List    Diagnosis Date Noted     Family history of ischemic heart disease 12/14/2022     Priority: Medium     Hypercholesteremia 12/14/2022     Priority: Medium     Atherosclerosis of aorta (H) 12/14/2022     Priority: Medium     Chronic bilateral low back pain with sciatica 04/11/2019     Priority: Medium     Ingrown toenail 10/04/2018     Priority: Medium     Intrinsic atopic dermatitis 09/28/2018     Priority: Medium     Actinic keratosis 09/28/2018     Priority: Medium     Benign neoplasm of skin of trunk, except scrotum 09/28/2018     Priority: Medium     Diverticulosis of sigmoid colon 11/23/2015     Priority: Medium     Health care maintenance 11/17/2015     Priority: Medium     Obesity (BMI 30-39.9) 08/31/2015     Priority: Medium     Past Medical History:   Diagnosis Date     Hyperlipidemia     No Comments Provided      Past Surgical History:   Procedure Laterality Date     COLONOSCOPY  11/23/2015    hyperplastic, follow up, 11/23/25     HYSTERECTOMY TOTAL ABDOMINAL, BILATERAL SALPINGO-OOPHORECTOMY, COMBINED      No Comments Provided     LAPAROSCOPIC TUBAL LIGATION      No Comments Provided     TONSILLECTOMY      No Comments Provided     Social History     Social History Narrative    Preload  6/7/2013  Works doing private homecare.  Rides motorcycles.     Current Outpatient Medications   Medication Sig Dispense Refill     aspirin (ASA) 81 MG EC tablet Take 1 tablet (81 mg) by mouth daily       atorvastatin (LIPITOR) 40 MG tablet Take 1 tablet (40 mg) by mouth daily 90 tablet 3     meloxicam (MOBIC) 15 MG tablet TAKE 1 TABLET BY MOUTH ONCE DAILY (Patient not taking: Reported on 12/14/2022) 30 tablet 5     No Known Allergies    Review of Systems     OBJECTIVE:     /84   Pulse 75   Temp 97.6  F (36.4  C)   Resp 18   Wt 84.6 kg (186 lb 9.6 oz)   SpO2 96%   BMI 34.69 kg/m    Body mass index is 34.69 kg/m .  Physical  Exam  Constitutional:       Appearance: Normal appearance.   HENT:      Head: Normocephalic and atraumatic.      Mouth/Throat:      Mouth: Mucous membranes are moist.   Cardiovascular:      Rate and Rhythm: Normal rate and regular rhythm.      Pulses: Normal pulses.   Neurological:      Mental Status: She is alert.   Psychiatric:         Mood and Affect: Mood normal.         Diagnostic Test Results:  Results for orders placed or performed in visit on 12/14/22 (from the past 24 hour(s))   CBC and Differential    Narrative    The following orders were created for panel order CBC and Differential.  Procedure                               Abnormality         Status                     ---------                               -----------         ------                     CBC with platelets and d...[[id number]]  Abnormal            Final result                 Please view results for these tests on the individual orders.   Comprehensive Metabolic Panel   Result Value Ref Range    Sodium 135 (L) 136 - 145 mmol/L    Potassium 5.0 3.4 - 5.3 mmol/L    Chloride 100 98 - 107 mmol/L    Carbon Dioxide (CO2) 24 22 - 29 mmol/L    Anion Gap 11 7 - 15 mmol/L    Urea Nitrogen 23.5 (H) 8.0 - 23.0 mg/dL    Creatinine 1.14 (H) 0.51 - 0.95 mg/dL    Calcium 9.6 8.8 - 10.2 mg/dL    Glucose 94 70 - 99 mg/dL    Alkaline Phosphatase 98 35 - 104 U/L    AST 22 10 - 35 U/L    ALT 24 10 - 35 U/L    Protein Total 7.5 6.4 - 8.3 g/dL    Albumin 4.5 3.5 - 5.2 g/dL    Bilirubin Total 0.3 <=1.2 mg/dL    GFR Estimate 52 (L) >60 mL/min/1.73m2   Troponin T, High Sensitivity   Result Value Ref Range    Troponin T, High Sensitivity 7 <=14 ng/L   Lipid Panel   Result Value Ref Range    Cholesterol 291 (H) <200 mg/dL    Triglycerides 167 (H) <150 mg/dL    Direct Measure HDL 54 >=50 mg/dL    LDL Cholesterol Calculated 204 (H) <=100 mg/dL    Non HDL Cholesterol 237 (H) <130 mg/dL    Narrative    Cholesterol  Desirable:  <200 mg/dL    Triglycerides  Normal:  Less  than 150 mg/dL  Borderline High:  150-199 mg/dL  High:  200-499 mg/dL  Very High:  Greater than or equal to 500 mg/dL    Direct Measure HDL  Female:  Greater than or equal to 50 mg/dL   Male:  Greater than or equal to 40 mg/dL    LDL Cholesterol  Desirable:  <100mg/dL  Above Desirable:  100-129 mg/dL   Borderline High:  130-159 mg/dL   High:  160-189 mg/dL   Very High:  >= 190 mg/dL    Non HDL Cholesterol  Desirable:  130 mg/dL  Above Desirable:  130-159 mg/dL  Borderline High:  160-189 mg/dL  High:  190-219 mg/dL  Very High:  Greater than or equal to 220 mg/dL   CBC with platelets and differential   Result Value Ref Range    WBC Count 11.6 (H) 4.0 - 11.0 10e3/uL    RBC Count 5.18 3.80 - 5.20 10e6/uL    Hemoglobin 15.8 (H) 11.7 - 15.7 g/dL    Hematocrit 47.1 (H) 35.0 - 47.0 %    MCV 91 78 - 100 fL    MCH 30.5 26.5 - 33.0 pg    MCHC 33.5 31.5 - 36.5 g/dL    RDW 12.5 10.0 - 15.0 %    Platelet Count 340 150 - 450 10e3/uL    % Neutrophils 54 %    % Lymphocytes 36 %    % Monocytes 7 %    % Eosinophils 1 %    % Basophils 1 %    % Immature Granulocytes 1 %    NRBCs per 100 WBC 0 <1 /100    Absolute Neutrophils 6.4 1.6 - 8.3 10e3/uL    Absolute Lymphocytes 4.2 0.8 - 5.3 10e3/uL    Absolute Monocytes 0.8 0.0 - 1.3 10e3/uL    Absolute Eosinophils 0.1 0.0 - 0.7 10e3/uL    Absolute Basophils 0.1 0.0 - 0.2 10e3/uL    Absolute Immature Granulocytes 0.1 <=0.4 10e3/uL    Absolute NRBCs 0.0 10e3/uL       ASSESSMENT/PLAN:         (R07.89) Atypical chest pain  (primary encounter diagnosis)  Comment:   Plan: CBC and Differential, Internal Medicine         Referral, CANCELED: EKG 12-lead, tracing only,         CANCELED: EKG 12-lead, tracing only            (R06.02) SOB (shortness of breath)  Comment:   Plan: CBC and Differential, Comprehensive Metabolic         Panel, Troponin T, High Sensitivity, Lipid         Panel, XR Chest 2 Views, Internal Medicine         Referral, CANCELED: EKG 12-lead, tracing only,         CANCELED: EKG  12-lead, tracing only            (Z13.6) Screening for cardiovascular condition  Comment:   Plan: Lipid Panel, [place] Screen Bilateral w/Eric            (Z82.49) Family history of abdominal aortic aneurysm (AAA)  Comment:   Plan: [place] Aorta Medicare AAA Screening            (Z12.31) Encounter for screening mammogram for breast cancer  Comment:   Plan:     (Z12.31) Encounter for screening mammogram for malignant neoplasm of breast  Comment:   Plan: [place] Screen Bilateral w/Eric            (E78.00) Hypercholesteremia  Comment:   Plan: atorvastatin (LIPITOR) 40 MG tablet            (I70.0) Atherosclerosis of aorta (H) shortness of breath  Comment:   Plan: Echocardiogram Complete           (L82.0) Inflamed seborrheic keratosis  Comment: 2 areas were cryo  Plan: DESTRUCT BENIGN LESION, UP TO 14        During the interview I had a very difficult time tracking the patient's complaint.  Before addressing and completing 1 concern she she would jump to another concern.  Finally advised her that we should focus on her shortness of breath and chest pain.  She certainly has significant risk factors including uncontrolled cholesterol strong family history history of remote smoking use although minimal and her recent chest x-ray does show atherosclerotic calcified aorta.  Other issues I advised her we can address later.  EKG was normal.  Because her dad has had a history of aortic aneurysm we will proceed with that aortic ultrasound.  Shortness of breath will be evaluated more with internal medicine consult and echocardiogram.  Patient would also like screening tests including mammogram done which was ordered.  And because of her history AAA ultrasound ordered.  Patient will be started on aspirin once a day and a cholesterol-lowering medication.  Isaiah Ley MD  [facility] AND [facility]

## 2022-12-14 NOTE — NURSING NOTE
Patient here for knee and joint pain along with muscle pain and cramping. Medication Reconciliation: complete.    Kary Warren LPN  12/14/2022 9:00 AM

## 2022-12-14 NOTE — LETTER
December 14, 2022      Izzy Lopez  [address] [address] [address]        Dear ,    We are writing to inform you of your test results.    Your test results fall within the expected range(s) or remain unchanged from previous results.  Please continue with current treatment plan.    Resulted Orders   Comprehensive Metabolic Panel   Result Value Ref Range    Sodium 135 (L) 136 - 145 mmol/L    Potassium 5.0 3.4 - 5.3 mmol/L    Chloride 100 98 - 107 mmol/L    Carbon Dioxide (CO2) 24 22 - 29 mmol/L    Anion Gap 11 7 - 15 mmol/L    Urea Nitrogen 23.5 (H) 8.0 - 23.0 mg/dL    Creatinine 1.14 (H) 0.51 - 0.95 mg/dL    Calcium 9.6 8.8 - 10.2 mg/dL    Glucose 94 70 - 99 mg/dL    Alkaline Phosphatase 98 35 - 104 U/L    AST 22 10 - 35 U/L    ALT 24 10 - 35 U/L    Protein Total 7.5 6.4 - 8.3 g/dL    Albumin 4.5 3.5 - 5.2 g/dL    Bilirubin Total 0.3 <=1.2 mg/dL    GFR Estimate 52 (L) >60 mL/min/1.73m2      Comment:      Effective December 21, 2021 eGFRcr in adults is calculated using the 2021 CKD-EPI creatinine equation which includes age and gender (Kandi et al., NEJM, DOI: 10.1056/HEMEsk7301574)   Troponin T, High Sensitivity   Result Value Ref Range    Troponin T, High Sensitivity 7 <=14 ng/L      Comment:      Either a High Sensitivity Troponin T baseline (0 hours) value = 100 ng/mL, or an increase in High Sensitivity Troponin T = 7 ng/mL at 2 hours compared to 0 hours (2-0 hours), suggests myocardial injury, and urgent clinical attention is required.    If the 2-0 hours increase is<7 ng/mL, a High Sensitivity Troponin T result above gender-specific reference ranges warrants further evaluation.   Recommendations for further evaluation include correlation with clinical decision-making tool (e.g., HEART), a 3rd High Sensitivity Troponin T test 2 hours after the 2nd (a 20% change from baseline would represent concern), admission for observation, close PCC/cardiology follow-up, or urgent outpatient  provocative testing.   Lipid Panel   Result Value Ref Range    Cholesterol 291 (H) <200 mg/dL    Triglycerides 167 (H) <150 mg/dL    Direct Measure HDL 54 >=50 mg/dL    LDL Cholesterol Calculated 204 (H) <=100 mg/dL    Non HDL Cholesterol 237 (H) <130 mg/dL    Narrative    Cholesterol  Desirable:  <200 mg/dL    Triglycerides  Normal:  Less than 150 mg/dL  Borderline High:  150-199 mg/dL  High:  200-499 mg/dL  Very High:  Greater than or equal to 500 mg/dL    Direct Measure HDL  Female:  Greater than or equal to 50 mg/dL   Male:  Greater than or equal to 40 mg/dL    LDL Cholesterol  Desirable:  <100mg/dL  Above Desirable:  100-129 mg/dL   Borderline High:  130-159 mg/dL   High:  160-189 mg/dL   Very High:  >= 190 mg/dL    Non HDL Cholesterol  Desirable:  130 mg/dL  Above Desirable:  130-159 mg/dL  Borderline High:  160-189 mg/dL  High:  190-219 mg/dL  Very High:  Greater than or equal to 220 mg/dL   CBC with platelets and differential   Result Value Ref Range    WBC Count 11.6 (H) 4.0 - 11.0 10e3/uL    RBC Count 5.18 3.80 - 5.20 10e6/uL    Hemoglobin 15.8 (H) 11.7 - 15.7 g/dL    Hematocrit 47.1 (H) 35.0 - 47.0 %    MCV 91 78 - 100 fL    MCH 30.5 26.5 - 33.0 pg    MCHC 33.5 31.5 - 36.5 g/dL    RDW 12.5 10.0 - 15.0 %    Platelet Count 340 150 - 450 10e3/uL    % Neutrophils 54 %    % Lymphocytes 36 %    % Monocytes 7 %    % Eosinophils 1 %    % Basophils 1 %    % Immature Granulocytes 1 %    NRBCs per 100 WBC 0 <1 /100    Absolute Neutrophils 6.4 1.6 - 8.3 10e3/uL    Absolute Lymphocytes 4.2 0.8 - 5.3 10e3/uL    Absolute Monocytes 0.8 0.0 - 1.3 10e3/uL    Absolute Eosinophils 0.1 0.0 - 0.7 10e3/uL    Absolute Basophils 0.1 0.0 - 0.2 10e3/uL    Absolute Immature Granulocytes 0.1 <=0.4 10e3/uL    Absolute NRBCs 0.0 10e3/uL       If you have any questions or concerns, please call the clinic at the number listed above.       Sincerely,      Isaiah Ley MD

## 2023-01-03 ENCOUNTER — OFFICE VISIT (OUTPATIENT)
Dept: PEDIATRICS | Facility: OTHER | Age: 68
End: 2023-01-03
Attending: INTERNAL MEDICINE
Payer: COMMERCIAL

## 2023-01-03 VITALS
RESPIRATION RATE: 17 BRPM | OXYGEN SATURATION: 98 % | TEMPERATURE: 98.5 F | SYSTOLIC BLOOD PRESSURE: 126 MMHG | HEART RATE: 75 BPM | BODY MASS INDEX: 34.76 KG/M2 | WEIGHT: 187 LBS | DIASTOLIC BLOOD PRESSURE: 82 MMHG

## 2023-01-03 DIAGNOSIS — R06.02 SOB (SHORTNESS OF BREATH): ICD-10-CM

## 2023-01-03 DIAGNOSIS — R07.89 CHEST HEAVINESS: Primary | ICD-10-CM

## 2023-01-03 DIAGNOSIS — Z13.29 SCREENING FOR THYROID DISORDER: ICD-10-CM

## 2023-01-03 DIAGNOSIS — M26.609 TMJ (TEMPOROMANDIBULAR JOINT SYNDROME): ICD-10-CM

## 2023-01-03 DIAGNOSIS — Z23 NEED FOR VACCINATION: ICD-10-CM

## 2023-01-03 DIAGNOSIS — N18.31 STAGE 3A CHRONIC KIDNEY DISEASE (H): ICD-10-CM

## 2023-01-03 DIAGNOSIS — R25.2 LEG CRAMPING: ICD-10-CM

## 2023-01-03 DIAGNOSIS — R07.89 ATYPICAL CHEST PAIN: ICD-10-CM

## 2023-01-03 LAB
ANION GAP SERPL CALCULATED.3IONS-SCNC: 6 MMOL/L (ref 7–15)
BUN SERPL-MCNC: 14.8 MG/DL (ref 8–23)
CALCIUM SERPL-MCNC: 9.7 MG/DL (ref 8.8–10.2)
CHLORIDE SERPL-SCNC: 104 MMOL/L (ref 98–107)
CREAT SERPL-MCNC: 0.97 MG/DL (ref 0.51–0.95)
DEPRECATED HCO3 PLAS-SCNC: 28 MMOL/L (ref 22–29)
GFR SERPL CREATININE-BSD FRML MDRD: 63 ML/MIN/1.73M2
GLUCOSE SERPL-MCNC: 91 MG/DL (ref 70–99)
MAGNESIUM SERPL-MCNC: 2 MG/DL (ref 1.7–2.3)
POTASSIUM SERPL-SCNC: 5.1 MMOL/L (ref 3.4–5.3)
SODIUM SERPL-SCNC: 138 MMOL/L (ref 136–145)
TSH SERPL DL<=0.005 MIU/L-ACNC: 1.4 UIU/ML (ref 0.3–4.2)

## 2023-01-03 PROCEDURE — 83735 ASSAY OF MAGNESIUM: CPT | Mod: ZL | Performed by: INTERNAL MEDICINE

## 2023-01-03 PROCEDURE — 84443 ASSAY THYROID STIM HORMONE: CPT | Mod: ZL | Performed by: INTERNAL MEDICINE

## 2023-01-03 PROCEDURE — 36415 COLL VENOUS BLD VENIPUNCTURE: CPT | Mod: ZL | Performed by: INTERNAL MEDICINE

## 2023-01-03 PROCEDURE — 99214 OFFICE O/P EST MOD 30 MIN: CPT | Performed by: INTERNAL MEDICINE

## 2023-01-03 PROCEDURE — G0463 HOSPITAL OUTPT CLINIC VISIT: HCPCS

## 2023-01-03 PROCEDURE — 80048 BASIC METABOLIC PNL TOTAL CA: CPT | Mod: ZL | Performed by: INTERNAL MEDICINE

## 2023-01-03 ASSESSMENT — PAIN SCALES - GENERAL: PAINLEVEL: NO PAIN (0)

## 2023-01-03 NOTE — PROGRESS NOTES
Assessment & Plan   1. Chest heaviness  Differential diagnosis includes stable angina, heart failure with reduced ejection fraction, heart failure with preserved ejection fraction, physical deconditioning, gastroesophageal reflux disease with aspiration, others.  The tenderness to palpation is likely caused by costochondritis however she does have this other symptom that is more characterized as a heaviness which could be caused by coronary disease.  Echocardiogram is currently pending.  Recommend stress testing.  - Echo Stress Echocardiogram  - Magnesium; Future  - Basic Metabolic Panel; Future  - Basic Metabolic Panel  - Magnesium    2. Atypical chest pain  - Internal Medicine Referral    3. SOB (shortness of breath)  - Internal Medicine Referral    4. Leg cramping  I think this is likely from venous stasis however differential diagnosis also includes various forms of heart failure, hypomagnesemia, etc.  Potassium was at the upper limit of normal and calcium was normal..  - TSH with free T4 reflex; Future  - Basic Metabolic Panel; Future  - Basic Metabolic Panel  - TSH with free T4 reflex    5. Stage 3a chronic kidney disease (H)  - Basic Metabolic Panel; Future  - Basic Metabolic Panel    6. TMJ (temporomandibular joint syndrome)  I recommend she goes to see her dentist    7. Screening for thyroid disorder  - TSH with free T4 reflex; Future  - TSH with free T4 reflex    8. Need for vaccination  I recommended influenza vaccine which the patient declined      Patient Instructions    -- Voltaren/diclofenac for chest wall pain     -- Lab today   -- Schedule stress test: bike with echo   -- Consider Cardiology consult if stress test is abnormal   -- Otherwise back to Dr. Ley to discuss other items     -- Dentist for TMJ      No follow-ups on file.    Signed, Jose Juan Bee MD, FAAP, FACP  Internal Medicine & Pediatrics    Subjective   Izzy Lpoez is a 67 year old female who presents for evaluation of chest pain  and shortness of breath.  She was referred by Dr. Ley.  She has been experiencing a heavy sensation across the chest associated with shortness of breath.  It happens most all the time but does seem to fluctuate some.  No known alleviating or exacerbating features.  She does get some tenderness to palpation along the right side of her anterior chest.  No heart palpitations.  No syncope or presyncope.  No orthopnea or paroxysmal nocturnal dyspnea.  She does get lower extremity cramping.  She has some trace chronic lower extremity edema.  Rare heartburn no change to frequency lately.  No NSAID use.  She is bothered by lots of different joints particularly her jaw.  It is clicking a lot.    Objective   Vitals: /82   Pulse 75   Temp 98.5  F (36.9  C) (Tympanic)   Resp 17   Wt 84.8 kg (187 lb)   SpO2 98%   BMI 34.76 kg/m      HEENT: Audible clicking with opening and closing of jaw.  Cardiovascular: Regular rate and rhythm, no murmur  Pulmonary: Clear    Review and Analysis of Data   I personally reviewed the following:  External notes: No  Results: Yes I reviewed her most recent lab work, EKG tracings, chest x-ray report  Use of an independent historian: No  Independent review of a test performed by another physician: No  Discussion of management with another physician: No  Moderate risk of morbidity from additional diagnostic testing and/or treatment.

## 2023-01-03 NOTE — PATIENT INSTRUCTIONS
-- Voltaren/diclofenac for chest wall pain     -- Lab today   -- Schedule stress test: bike with echo   -- Consider Cardiology consult if stress test is abnormal   -- Otherwise back to Dr. Ley to discuss other items     -- Dentist for TMJ

## 2023-01-03 NOTE — NURSING NOTE
"Chief Complaint   Patient presents with     RECHECK     Sob AND CHEST PAIN       Initial /82   Pulse 75   Temp 98.5  F (36.9  C) (Tympanic)   Resp 17   Wt 84.8 kg (187 lb)   SpO2 98%   BMI 34.76 kg/m   Estimated body mass index is 34.76 kg/m  as calculated from the following:    Height as of 5/5/18: 1.562 m (5' 1.5\").    Weight as of this encounter: 84.8 kg (187 lb).  Medication Reconciliation: complete    FOOD SECURITY SCREENING QUESTIONS  Hunger Vital Signs:  Within the past 12 months we worried whether our food would run out before we got money to buy more. Never  Within the past 12 months the food we bought just didn't last and we didn't have money to get more. Never  Shara Orlando LPN 1/3/2023 8:56 AM        "

## 2023-01-03 NOTE — LETTER
January 3, 2023      Izzy Lopez  [address] [address] [address]        Dear ,    We are writing to inform you of your test results.    Labs are normal.    Signed, Jose Juan Bee MD, FAAP, FACP  Internal Medicine & Pediatrics      Resulted Orders   Basic Metabolic Panel   Result Value Ref Range    Sodium 138 136 - 145 mmol/L    Potassium 5.1 3.4 - 5.3 mmol/L    Chloride 104 98 - 107 mmol/L    Carbon Dioxide (CO2) 28 22 - 29 mmol/L    Anion Gap 6 (L) 7 - 15 mmol/L    Urea Nitrogen 14.8 8.0 - 23.0 mg/dL    Creatinine 0.97 (H) 0.51 - 0.95 mg/dL    Calcium 9.7 8.8 - 10.2 mg/dL    Glucose 91 70 - 99 mg/dL    GFR Estimate 63 >60 mL/min/1.73m2      Comment:      Effective December 21, 2021 eGFRcr in adults is calculated using the 2021 CKD-EPI creatinine equation which includes age and gender (Kandi et al., NEJ, DOI: 10.1056/RUQLnl5694906)   Magnesium   Result Value Ref Range    Magnesium 2.0 1.7 - 2.3 mg/dL   TSH with free T4 reflex   Result Value Ref Range    TSH 1.40 0.30 - 4.20 uIU/mL       If you have any questions or concerns, please call the clinic at the number listed above.       Sincerely,      Jose Juan Bee MD

## 2023-01-11 ENCOUNTER — TELEPHONE (OUTPATIENT)
Dept: CARDIOLOGY | Facility: OTHER | Age: 68
End: 2023-01-11

## 2023-01-11 NOTE — TELEPHONE ENCOUNTER
Patient verified .  Reminder call for stress test with instructions given.  Pt states she does have chronic low back pain.  She is having an echo on 23 and will be shown how the bike looks on the bed to see if her back can tolerate biking for approximately 5 to 9 minutes to achieve THR.  She states she does have continued lower abdominal discomfort and writer encouraged her to seek care at ER or urgent care clinic if this is becoming worse.   Patient verbalized understanding.

## 2023-01-12 ENCOUNTER — TELEPHONE (OUTPATIENT)
Dept: CARDIOLOGY | Facility: OTHER | Age: 68
End: 2023-01-12

## 2023-01-12 ENCOUNTER — HOSPITAL ENCOUNTER (OUTPATIENT)
Dept: MAMMOGRAPHY | Facility: OTHER | Age: 68
Discharge: HOME | End: 2023-01-12
Attending: FAMILY MEDICINE
Payer: MEDICARE

## 2023-01-12 ENCOUNTER — HOSPITAL ENCOUNTER (OUTPATIENT)
Dept: CARDIOLOGY | Facility: OTHER | Age: 68
Discharge: HOME | End: 2023-01-12
Attending: FAMILY MEDICINE
Payer: MEDICARE

## 2023-01-12 ENCOUNTER — HOSPITAL ENCOUNTER (OUTPATIENT)
Dept: ULTRASOUND IMAGING | Facility: OTHER | Age: 68
Discharge: HOME | End: 2023-01-12
Attending: FAMILY MEDICINE
Payer: MEDICARE

## 2023-01-12 DIAGNOSIS — Z13.6 SCREENING FOR CARDIOVASCULAR CONDITION: ICD-10-CM

## 2023-01-12 DIAGNOSIS — Z12.31 ENCOUNTER FOR SCREENING MAMMOGRAM FOR MALIGNANT NEOPLASM OF BREAST: ICD-10-CM

## 2023-01-12 DIAGNOSIS — Z82.49 FAMILY HISTORY OF ABDOMINAL AORTIC ANEURYSM (AAA): ICD-10-CM

## 2023-01-12 DIAGNOSIS — I70.0 ATHEROSCLEROSIS OF AORTA (H): ICD-10-CM

## 2023-01-12 LAB — LVEF ECHO: NORMAL

## 2023-01-12 PROCEDURE — 77067 SCR MAMMO BI INCL CAD: CPT

## 2023-01-12 PROCEDURE — 999N000208 ECHOCARDIOGRAM COMPLETE

## 2023-01-12 PROCEDURE — 76706 US ABDL AORTA SCREEN AAA: CPT

## 2023-01-12 PROCEDURE — 93306 TTE W/DOPPLER COMPLETE: CPT | Mod: 26 | Performed by: INTERNAL MEDICINE

## 2023-01-12 PROCEDURE — 255N000002 HC RX 255 OP 636: Performed by: FAMILY MEDICINE

## 2023-01-12 RX ADMIN — PERFLUTREN 4 ML: 6.52 INJECTION, SUSPENSION INTRAVENOUS at 09:17

## 2023-01-12 NOTE — TELEPHONE ENCOUNTER
Pt verified her .  Her stress echo test which is scheduled for 23 will be cancelled per Dr. Tadeo Medical director review.  Her echo was done 23 and [facility] recommending a different test.  Dr. Bee,  ordering MD for stress test will call patient on 23 for a different test.      Patient reports she is having more low abdominal discomfort states her urine has odor to it and is requesting urine test.  Writer will send note to Dr. Ley as patient was seen by  on 22.

## 2023-01-12 NOTE — TELEPHONE ENCOUNTER
Message left on pts voice mail that she should be seen at urgent care per previous phone call if she is having symptoms.

## 2023-01-13 ENCOUNTER — TELEPHONE (OUTPATIENT)
Dept: CARDIOLOGY | Facility: OTHER | Age: 68
End: 2023-01-13

## 2023-01-13 ENCOUNTER — OFFICE VISIT (OUTPATIENT)
Dept: INTERNAL MEDICINE | Facility: OTHER | Age: 68
End: 2023-01-13
Payer: MEDICARE

## 2023-01-13 ENCOUNTER — HOSPITAL ENCOUNTER (OUTPATIENT)
Dept: CT IMAGING | Facility: OTHER | Age: 68
Discharge: HOME | End: 2023-01-13
Payer: MEDICARE

## 2023-01-13 VITALS
HEART RATE: 76 BPM | TEMPERATURE: 97.9 F | BODY MASS INDEX: 34.58 KG/M2 | WEIGHT: 186 LBS | RESPIRATION RATE: 20 BRPM | OXYGEN SATURATION: 95 % | SYSTOLIC BLOOD PRESSURE: 128 MMHG | DIASTOLIC BLOOD PRESSURE: 72 MMHG

## 2023-01-13 DIAGNOSIS — K57.30 DIVERTICULOSIS OF SIGMOID COLON: ICD-10-CM

## 2023-01-13 DIAGNOSIS — B37.0 ORAL THRUSH: ICD-10-CM

## 2023-01-13 DIAGNOSIS — R79.82 CRP ELEVATED: ICD-10-CM

## 2023-01-13 DIAGNOSIS — D72.829 LEUKOCYTOSIS, UNSPECIFIED TYPE: ICD-10-CM

## 2023-01-13 DIAGNOSIS — R10.30 LOWER ABDOMINAL PAIN: Primary | ICD-10-CM

## 2023-01-13 DIAGNOSIS — K59.00 CONSTIPATION, UNSPECIFIED CONSTIPATION TYPE: ICD-10-CM

## 2023-01-13 LAB
ALBUMIN SERPL BCG-MCNC: 3.7 G/DL (ref 3.5–5.2)
ALBUMIN UR-MCNC: NEGATIVE MG/DL
ALP SERPL-CCNC: 80 U/L (ref 35–104)
ALT SERPL W P-5'-P-CCNC: 11 U/L (ref 10–35)
AMYLASE SERPL-CCNC: 59 U/L (ref 28–100)
ANION GAP SERPL CALCULATED.3IONS-SCNC: 10 MMOL/L (ref 7–15)
APPEARANCE UR: CLEAR
AST SERPL W P-5'-P-CCNC: 16 U/L (ref 10–35)
BACTERIA #/AREA URNS HPF: ABNORMAL /HPF
BASOPHILS # BLD AUTO: 0 10E3/UL (ref 0–0.2)
BASOPHILS NFR BLD AUTO: 0 %
BILIRUB SERPL-MCNC: 0.6 MG/DL
BILIRUB UR QL STRIP: NEGATIVE
BUN SERPL-MCNC: 17.7 MG/DL (ref 8–23)
CALCIUM SERPL-MCNC: 9.2 MG/DL (ref 8.8–10.2)
CHLORIDE SERPL-SCNC: 98 MMOL/L (ref 98–107)
COLOR UR AUTO: ABNORMAL
CREAT SERPL-MCNC: 1 MG/DL (ref 0.51–0.95)
CRP SERPL-MCNC: 91.14 MG/L
DEPRECATED HCO3 PLAS-SCNC: 25 MMOL/L (ref 22–29)
EOSINOPHIL # BLD AUTO: 0.3 10E3/UL (ref 0–0.7)
EOSINOPHIL NFR BLD AUTO: 2 %
ERYTHROCYTE [DISTWIDTH] IN BLOOD BY AUTOMATED COUNT: 12.2 % (ref 10–15)
GFR SERPL CREATININE-BSD FRML MDRD: 61 ML/MIN/1.73M2
GLUCOSE SERPL-MCNC: 87 MG/DL (ref 70–99)
GLUCOSE UR STRIP-MCNC: NEGATIVE MG/DL
HCT VFR BLD AUTO: 41.3 % (ref 35–47)
HGB BLD-MCNC: 13.6 G/DL (ref 11.7–15.7)
HGB UR QL STRIP: ABNORMAL
HOLD SPECIMEN: NORMAL
IMM GRANULOCYTES # BLD: 0.1 10E3/UL
IMM GRANULOCYTES NFR BLD: 0 %
KETONES UR STRIP-MCNC: NEGATIVE MG/DL
LEUKOCYTE ESTERASE UR QL STRIP: NEGATIVE
LIPASE SERPL-CCNC: 34 U/L (ref 13–60)
LYMPHOCYTES # BLD AUTO: 3.9 10E3/UL (ref 0.8–5.3)
LYMPHOCYTES NFR BLD AUTO: 32 %
MCH RBC QN AUTO: 29.8 PG (ref 26.5–33)
MCHC RBC AUTO-ENTMCNC: 32.9 G/DL (ref 31.5–36.5)
MCV RBC AUTO: 90 FL (ref 78–100)
MONOCYTES # BLD AUTO: 1.3 10E3/UL (ref 0–1.3)
MONOCYTES NFR BLD AUTO: 11 %
MUCOUS THREADS #/AREA URNS LPF: PRESENT /LPF
NEUTROPHILS # BLD AUTO: 6.8 10E3/UL (ref 1.6–8.3)
NEUTROPHILS NFR BLD AUTO: 55 %
NITRATE UR QL: NEGATIVE
NRBC # BLD AUTO: 0 10E3/UL
NRBC BLD AUTO-RTO: 0 /100
PH UR STRIP: 6 [PH] (ref 5–9)
PLATELET # BLD AUTO: 401 10E3/UL (ref 150–450)
POTASSIUM SERPL-SCNC: 4.7 MMOL/L (ref 3.4–5.3)
PROT SERPL-MCNC: 7.4 G/DL (ref 6.4–8.3)
RBC # BLD AUTO: 4.57 10E6/UL (ref 3.8–5.2)
RBC URINE: 2 /HPF
SODIUM SERPL-SCNC: 133 MMOL/L (ref 136–145)
SP GR UR STRIP: 1.01 (ref 1–1.03)
UROBILINOGEN UR STRIP-MCNC: NORMAL MG/DL
WBC # BLD AUTO: 12.4 10E3/UL (ref 4–11)
WBC URINE: 1 /HPF

## 2023-01-13 PROCEDURE — 36415 COLL VENOUS BLD VENIPUNCTURE: CPT | Mod: ZL

## 2023-01-13 PROCEDURE — 85004 AUTOMATED DIFF WBC COUNT: CPT | Mod: ZL

## 2023-01-13 PROCEDURE — 99214 OFFICE O/P EST MOD 30 MIN: CPT

## 2023-01-13 PROCEDURE — G0463 HOSPITAL OUTPT CLINIC VISIT: HCPCS | Mod: 25

## 2023-01-13 PROCEDURE — 82150 ASSAY OF AMYLASE: CPT | Mod: ZL

## 2023-01-13 PROCEDURE — 250N000011 HC RX IP 250 OP 636

## 2023-01-13 PROCEDURE — 80053 COMPREHEN METABOLIC PANEL: CPT | Mod: ZL

## 2023-01-13 PROCEDURE — 81001 URINALYSIS AUTO W/SCOPE: CPT | Mod: ZL

## 2023-01-13 PROCEDURE — 83690 ASSAY OF LIPASE: CPT | Mod: ZL

## 2023-01-13 PROCEDURE — 74177 CT ABD & PELVIS W/CONTRAST: CPT | Mod: MG

## 2023-01-13 PROCEDURE — G1010 CDSM STANSON: HCPCS

## 2023-01-13 PROCEDURE — 86140 C-REACTIVE PROTEIN: CPT | Mod: ZL

## 2023-01-13 PROCEDURE — G0463 HOSPITAL OUTPT CLINIC VISIT: HCPCS

## 2023-01-13 RX ORDER — IOPAMIDOL 755 MG/ML
107 INJECTION, SOLUTION INTRAVASCULAR ONCE
Status: COMPLETED | OUTPATIENT
Start: 2023-01-13 | End: 2023-01-13

## 2023-01-13 RX ORDER — POLYETHYLENE GLYCOL 3350 17 G/17G
1 POWDER, FOR SOLUTION ORAL DAILY
Qty: 850 G | Refills: 3 | Status: SHIPPED | OUTPATIENT
Start: 2023-01-13 | End: 2023-09-18

## 2023-01-13 RX ORDER — NYSTATIN 100000/ML
500000 SUSPENSION, ORAL (FINAL DOSE FORM) ORAL 4 TIMES DAILY
Qty: 120 ML | Refills: 1 | Status: SHIPPED | OUTPATIENT
Start: 2023-01-13 | End: 2023-01-18

## 2023-01-13 RX ADMIN — IOPAMIDOL 107 ML: 755 INJECTION, SOLUTION INTRAVENOUS at 15:24

## 2023-01-13 ASSESSMENT — ENCOUNTER SYMPTOMS
DYSURIA: 0
HEARTBURN: 1
HEADACHES: 0
DIARRHEA: 0
COUGH: 0
SHORTNESS OF BREATH: 0
DIZZINESS: 0
FLANK PAIN: 1
HEMATURIA: 0
CHILLS: 0
FREQUENCY: 0
CONSTIPATION: 1
HEMATOCHEZIA: 0
DIFFICULTY URINATING: 0
ABDOMINAL DISTENTION: 1
APPETITE CHANGE: 0
MYALGIAS: 0
ABDOMINAL PAIN: 1
FEVER: 0
NAUSEA: 1
VOMITING: 1

## 2023-01-13 ASSESSMENT — PAIN SCALES - GENERAL: PAINLEVEL: EXTREME PAIN (8)

## 2023-01-13 NOTE — PATIENT INSTRUCTIONS
Urinalysis does not show a sign of an infection.    Lab work drawn today.  We will check CBC, CMP, amylase, lipase, CRP.  If your lab work is stable we will proceed with CT scan next Tuesday schedulers will call you to schedule this.  If your lab work is concerning today, we will do a CT scan today at 330.    If lab work is normal, take MiraLAX 2 times daily and consider over-the-counter magnesium citrate for constipation.  If your symptoms improve with this remedy, you may cancel CT scan next week.    Results for orders placed or performed in visit on 01/13/23   UA reflex to Microscopic and Culture     Status: Abnormal    Specimen: Urine, Clean Catch   Result Value Ref Range    Color Urine Light Yellow Colorless, Straw, Light Yellow, Yellow    Appearance Urine Clear Clear    Glucose Urine Negative Negative mg/dL    Bilirubin Urine Negative Negative    Ketones Urine Negative Negative mg/dL    Specific Gravity Urine 1.012 1.000 - 1.030    Blood Urine Small (A) Negative    pH Urine 6.0 5.0 - 9.0    Protein Albumin Urine Negative Negative mg/dL    Urobilinogen Urine Normal Normal, 2.0 mg/dL    Nitrite Urine Negative Negative    Leukocyte Esterase Urine Negative Negative    Bacteria Urine Few (A) None Seen /HPF    Mucus Urine Present (A) None Seen /LPF    RBC Urine 2 <=2 /HPF    WBC Urine 1 <=5 /HPF    Narrative    Urine Culture not indicated   Extra Tube     Status: None ()    Narrative    The following orders were created for panel order Extra Tube.  Procedure                               Abnormality         Status                     ---------                               -----------         ------                     Extra Serum Separator Tu...[[id number]]                                                   Please view results for these tests on the individual orders.   CBC and Differential     Status: None ()    Narrative    The following orders were created for panel order CBC and Differential.  Procedure                                Abnormality         Status                     ---------                               -----------         ------                     CBC with platelets and d...[[id number]]                                                   Please view results for these tests on the individual orders.

## 2023-01-13 NOTE — TELEPHONE ENCOUNTER
Pt verified her .  Instructed patient that she needs to be seen either at clinic for this or at [facility].  She verbalized understanding and states she will be seen today.

## 2023-01-13 NOTE — TELEPHONE ENCOUNTER
Left message saying she needs to be seen and can go to [facility].  Norma J. Gosselin, LPN .......  1/13/2023  11:21 AM

## 2023-01-13 NOTE — PROGRESS NOTES
Assessment & Plan      Izzy Lopez is a 67 year old presenting for the following health issues:      ICD-10-CM    1. Lower abdominal pain  R10.30 UA reflex to Microscopic and Culture     UA reflex to Microscopic and Culture     CBC and Differential     Comprehensive Metabolic Panel     CRP inflammation     Lipase     Amylase     CBC and Differential     Comprehensive Metabolic Panel     CRP inflammation     Lipase     Amylase     CT Abdomen Pelvis w Contrast     CT Abdomen Pelvis w Contrast     CANCELED: CT Abdomen Pelvis w Contrast     CANCELED: CT Abdomen Pelvis w Contrast      2. Diverticulosis of sigmoid colon  K57.30       3. Constipation, unspecified constipation type  K59.00 polyethylene glycol (MIRALAX) 17 GM/Dose powder      4. Oral thrush  B37.0 nystatin (MYCOSTATIN) 384406 UNIT/ML suspension      5. CRP elevated  R79.82       6. Leukocytosis, unspecified type  D72.829         Patient's lower abdominal pain likely due to ongoing constipation.  She does have mild leukocytosis and elevated CRP which resulted and proceeding with a abdominal CT which had negative findings other than possible constipation.  Instructed patient to use MiraLAX 2 times a day until results and then once a day daily for maintenance, instructed she may also use Dulcolax until she has positive results.    Start nystatin swish and spit 2 times daily for 10 days for possible oral thrush.    Instructed patient to come back in if symptoms do not improve or worsen.        Return if symptoms worsen or fail to improve.    AKBAR Braxton [facility]      Subjective   Izzy is a 67 year old, presenting for the following health issues:  Pain (Lower abdominal pain for over a month. Urine has odor and sometime milky./Tami Viveros LPN on 1/13/2023 at 10:43 AM/)    Patient presents to clinic with lower abdominal pain that radiates into her back/flank area that has been going on for 1 month.  She states  that she feels bloated and tight.  She also mentions that she has had a hysterectomy without removal of her ovaries but was taking estrogen for 10 years.  She states that she also has been having episodes of intermittent constipation to the point of feeling nauseated and vomited a couple of episodes.  She states that she does not feel like her abdominal pain is related to constipation as she has dealt with this in the past and she states she continues to pass gas, has had normal bowel movements recently, she does not normally take any over-the-counter remedies for this. She does have a history of diverticulitis in the past.        Musculoskeletal Problem  Associated symptoms include abdominal pain, nausea and vomiting. Pertinent negatives include no chills, coughing, fever, headaches, myalgias or rash.   History of Present Illness       Reason for visit:  Lower abdominal pain    She eats 0-1 servings of fruits and vegetables daily.She consumes 0 sweetened beverage(s) daily.She exercises with enough effort to increase her heart rate 9 or less minutes per day.  She exercises with enough effort to increase her heart rate 3 or less days per week.   She is taking medications regularly.       Review of Systems   Constitutional: Negative for appetite change, chills and fever.   HENT: Positive for mouth sores ( white coating on tongue).    Respiratory: Negative for cough and shortness of breath.    Gastrointestinal: Positive for abdominal distention, abdominal pain, constipation, heartburn ( minimal), nausea and vomiting. Negative for diarrhea and hematochezia.        2 occassions episodes that resulted in vomiting and diarrhea     Genitourinary: Positive for flank pain. Negative for difficulty urinating, dysuria, frequency, hematuria, urgency and vaginal discharge.   Musculoskeletal: Negative for myalgias.   Skin: Negative for rash.   Neurological: Negative for dizziness and headaches.   All other systems reviewed and are  negative.           Objective    /72 (BP Location: Right arm, Patient Position: Sitting, Cuff Size: Adult Large)   Pulse 76   Temp 97.9  F (36.6  C)   Resp 20   Wt 84.4 kg (186 lb)   SpO2 95%   BMI 34.58 kg/m    Body mass index is 34.58 kg/m .  Physical Exam  Constitutional:       General: She is not in acute distress.     Appearance: Normal appearance. She is not toxic-appearing.   HENT:      Mouth/Throat:      Tongue: Tongue lesions:  white plaque on tongue.   Cardiovascular:      Rate and Rhythm: Normal rate and regular rhythm.      Pulses: Normal pulses.      Heart sounds: Normal heart sounds. No murmur heard.  Pulmonary:      Effort: Pulmonary effort is normal.      Breath sounds: Normal breath sounds. No wheezing or rhonchi.   Abdominal:      General: There is no distension.      Palpations: There is no mass.      Tenderness: There is abdominal tenderness. There is rebound. There is no right CVA tenderness or left CVA tenderness.      Hernia: No hernia is present.   Musculoskeletal:         General: No swelling or deformity. Normal range of motion.      Right lower leg: No edema.      Left lower leg: No edema.   Skin:     General: Skin is warm.      Coloration: Skin is not jaundiced or pale.   Neurological:      General: No focal deficit present.      Mental Status: She is alert. Mental status is at baseline.   Psychiatric:         Mood and Affect: Mood normal.         Behavior: Behavior normal.         Thought Content: Thought content normal.        Results for orders placed or performed in visit on 01/13/23   UA reflex to Microscopic and Culture     Status: Abnormal    Specimen: Urine, Clean Catch   Result Value Ref Range    Color Urine Light Yellow Colorless, Straw, Light Yellow, Yellow    Appearance Urine Clear Clear    Glucose Urine Negative Negative mg/dL    Bilirubin Urine Negative Negative    Ketones Urine Negative Negative mg/dL    Specific Gravity Urine 1.012 1.000 - 1.030    Blood Urine  Small (A) Negative    pH Urine 6.0 5.0 - 9.0    Protein Albumin Urine Negative Negative mg/dL    Urobilinogen Urine Normal Normal, 2.0 mg/dL    Nitrite Urine Negative Negative    Leukocyte Esterase Urine Negative Negative    Bacteria Urine Few (A) None Seen /HPF    Mucus Urine Present (A) None Seen /LPF    RBC Urine 2 <=2 /HPF    WBC Urine 1 <=5 /HPF    Narrative    Urine Culture not indicated   Comprehensive Metabolic Panel     Status: Abnormal   Result Value Ref Range    Sodium 133 (L) 136 - 145 mmol/L    Potassium 4.7 3.4 - 5.3 mmol/L    Chloride 98 98 - 107 mmol/L    Carbon Dioxide (CO2) 25 22 - 29 mmol/L    Anion Gap 10 7 - 15 mmol/L    Urea Nitrogen 17.7 8.0 - 23.0 mg/dL    Creatinine 1.00 (H) 0.51 - 0.95 mg/dL    Calcium 9.2 8.8 - 10.2 mg/dL    Glucose 87 70 - 99 mg/dL    Alkaline Phosphatase 80 35 - 104 U/L    AST 16 10 - 35 U/L    ALT 11 10 - 35 U/L    Protein Total 7.4 6.4 - 8.3 g/dL    Albumin 3.7 3.5 - 5.2 g/dL    Bilirubin Total 0.6 <=1.2 mg/dL    GFR Estimate 61 >60 mL/min/1.73m2   CRP inflammation     Status: Abnormal   Result Value Ref Range    CRP Inflammation 91.14 (H) <5.00 mg/L   Lipase     Status: Normal   Result Value Ref Range    Lipase 34 13 - 60 U/L   Amylase     Status: Normal   Result Value Ref Range    Amylase 59 28 - 100 U/L   CBC with platelets and differential     Status: Abnormal   Result Value Ref Range    WBC Count 12.4 (H) 4.0 - 11.0 10e3/uL    RBC Count 4.57 3.80 - 5.20 10e6/uL    Hemoglobin 13.6 11.7 - 15.7 g/dL    Hematocrit 41.3 35.0 - 47.0 %    MCV 90 78 - 100 fL    MCH 29.8 26.5 - 33.0 pg    MCHC 32.9 31.5 - 36.5 g/dL    RDW 12.2 10.0 - 15.0 %    Platelet Count 401 150 - 450 10e3/uL    % Neutrophils 55 %    % Lymphocytes 32 %    % Monocytes 11 %    % Eosinophils 2 %    % Basophils 0 %    % Immature Granulocytes 0 %    NRBCs per 100 WBC 0 <1 /100    Absolute Neutrophils 6.8 1.6 - 8.3 10e3/uL    Absolute Lymphocytes 3.9 0.8 - 5.3 10e3/uL    Absolute Monocytes 1.3 0.0 - 1.3  10e3/uL    Absolute Eosinophils 0.3 0.0 - 0.7 10e3/uL    Absolute Basophils 0.0 0.0 - 0.2 10e3/uL    Absolute Immature Granulocytes 0.1 <=0.4 10e3/uL    Absolute NRBCs 0.0 10e3/uL   Extra Tube     Status: None    Narrative    The following orders were created for panel order Extra Tube.  Procedure                               Abnormality         Status                     ---------                               -----------         ------                     Extra Serum Separator Tu...[[id number]]                      Final result                 Please view results for these tests on the individual orders.   Extra Serum Separator Tube (SST)     Status: None   Result Value Ref Range    Hold Specimen [facility]    CBC and Differential     Status: Abnormal    Narrative    The following orders were created for panel order CBC and Differential.  Procedure                               Abnormality         Status                     ---------                               -----------         ------                     CBC with platelets and d...[[id number]]  Abnormal            Final result                 Please view results for these tests on the individual orders.

## 2023-01-16 ENCOUNTER — TELEPHONE (OUTPATIENT)
Dept: PEDIATRICS | Facility: OTHER | Age: 68
End: 2023-01-16

## 2023-01-16 DIAGNOSIS — R93.1 REGIONAL WALL MOTION ABNORMALITY OF HEART: ICD-10-CM

## 2023-01-16 DIAGNOSIS — I50.22 CHRONIC HFREF (HEART FAILURE WITH REDUCED EJECTION FRACTION) (H): Primary | ICD-10-CM

## 2023-01-16 NOTE — TELEPHONE ENCOUNTER
----- Message from Darlene Stewart RN sent at 1/12/2023  3:14 PM CST -----  Regarding: stress test  See echo report of 1/12/23.  Per Dr. Tadeo review and echo report with [facility], bike stress echo cancelled for 1/13/23.  [facility] and Dr. Tadeo recommend different test or angiogram.  Thanks Darlene.

## 2023-01-16 NOTE — TELEPHONE ENCOUNTER
ICD-10-CM    1. Chronic HFrEF (heart failure with reduced ejection fraction) (H)  I50.22 Adult Cardiology Eval  Referral      2. Regional wall motion abnormality of heart  R93.1 Adult Cardiology Eval  Referral         No orders of the defined types were placed in this encounter.    Jose Juan Molina MD, FAAP, FACP  Internal Medicine & Pediatrics

## 2023-01-18 ENCOUNTER — OFFICE VISIT (OUTPATIENT)
Dept: INTERNAL MEDICINE | Facility: OTHER | Age: 68
End: 2023-01-18
Payer: COMMERCIAL

## 2023-01-18 ENCOUNTER — OFFICE VISIT (OUTPATIENT)
Dept: CARDIOLOGY | Facility: OTHER | Age: 68
End: 2023-01-18
Attending: NURSE PRACTITIONER
Payer: MEDICARE

## 2023-01-18 ENCOUNTER — TELEPHONE (OUTPATIENT)
Dept: CARDIOLOGY | Facility: OTHER | Age: 68
End: 2023-01-18

## 2023-01-18 VITALS
TEMPERATURE: 97.6 F | HEART RATE: 66 BPM | DIASTOLIC BLOOD PRESSURE: 76 MMHG | SYSTOLIC BLOOD PRESSURE: 130 MMHG | OXYGEN SATURATION: 95 % | BODY MASS INDEX: 34.2 KG/M2 | RESPIRATION RATE: 20 BRPM | WEIGHT: 181 LBS

## 2023-01-18 VITALS
TEMPERATURE: 97.6 F | OXYGEN SATURATION: 95 % | RESPIRATION RATE: 20 BRPM | HEIGHT: 61 IN | SYSTOLIC BLOOD PRESSURE: 130 MMHG | HEART RATE: 66 BPM | DIASTOLIC BLOOD PRESSURE: 76 MMHG | BODY MASS INDEX: 34.23 KG/M2 | WEIGHT: 181.31 LBS

## 2023-01-18 DIAGNOSIS — R10.30 LOWER ABDOMINAL PAIN: Primary | ICD-10-CM

## 2023-01-18 DIAGNOSIS — R09.89 DEPRESSED LEFT VENTRICULAR EJECTION FRACTION: Primary | ICD-10-CM

## 2023-01-18 DIAGNOSIS — R93.1 REGIONAL WALL MOTION ABNORMALITY OF HEART: ICD-10-CM

## 2023-01-18 DIAGNOSIS — K59.00 CONSTIPATION, UNSPECIFIED CONSTIPATION TYPE: ICD-10-CM

## 2023-01-18 DIAGNOSIS — K57.30 DIVERTICULOSIS OF SIGMOID COLON: ICD-10-CM

## 2023-01-18 DIAGNOSIS — Z82.49 FAMILY HISTORY OF ISCHEMIC HEART DISEASE: ICD-10-CM

## 2023-01-18 DIAGNOSIS — R06.09 DOE (DYSPNEA ON EXERTION): ICD-10-CM

## 2023-01-18 DIAGNOSIS — E78.00 HYPERCHOLESTEREMIA: ICD-10-CM

## 2023-01-18 DIAGNOSIS — R07.89 CHEST PRESSURE: ICD-10-CM

## 2023-01-18 LAB
ATRIAL RATE - MUSE: 59 BPM
DIASTOLIC BLOOD PRESSURE - MUSE: NORMAL MMHG
INTERPRETATION ECG - MUSE: NORMAL
P AXIS - MUSE: 50 DEGREES
PR INTERVAL - MUSE: 132 MS
QRS DURATION - MUSE: 86 MS
QT - MUSE: 430 MS
QTC - MUSE: 425 MS
R AXIS - MUSE: 42 DEGREES
SYSTOLIC BLOOD PRESSURE - MUSE: NORMAL MMHG
T AXIS - MUSE: 51 DEGREES
VENTRICULAR RATE- MUSE: 59 BPM

## 2023-01-18 PROCEDURE — 93010 ELECTROCARDIOGRAM REPORT: CPT | Performed by: INTERNAL MEDICINE

## 2023-01-18 PROCEDURE — 99214 OFFICE O/P EST MOD 30 MIN: CPT

## 2023-01-18 PROCEDURE — 99204 OFFICE O/P NEW MOD 45 MIN: CPT | Performed by: NURSE PRACTITIONER

## 2023-01-18 PROCEDURE — 93005 ELECTROCARDIOGRAM TRACING: CPT | Performed by: NURSE PRACTITIONER

## 2023-01-18 PROCEDURE — G0463 HOSPITAL OUTPT CLINIC VISIT: HCPCS | Mod: 25

## 2023-01-18 PROCEDURE — G0463 HOSPITAL OUTPT CLINIC VISIT: HCPCS

## 2023-01-18 RX ORDER — POTASSIUM CHLORIDE 1500 MG/1
40 TABLET, EXTENDED RELEASE ORAL
Status: CANCELLED | OUTPATIENT
Start: 2023-01-18

## 2023-01-18 RX ORDER — SENNOSIDES A AND B 8.6 MG/1
2-4 TABLET, FILM COATED ORAL 2 TIMES DAILY PRN
Qty: 90 TABLET | Refills: 1 | Status: SHIPPED | OUTPATIENT
Start: 2023-01-18 | End: 2023-09-18

## 2023-01-18 RX ORDER — SODIUM CHLORIDE 9 MG/ML
INJECTION, SOLUTION INTRAVENOUS CONTINUOUS
Status: CANCELLED | OUTPATIENT
Start: 2023-01-18

## 2023-01-18 RX ORDER — POTASSIUM CHLORIDE 1500 MG/1
20 TABLET, EXTENDED RELEASE ORAL
Status: CANCELLED | OUTPATIENT
Start: 2023-01-18

## 2023-01-18 RX ORDER — LIDOCAINE 40 MG/G
CREAM TOPICAL
Status: CANCELLED | OUTPATIENT
Start: 2023-01-18

## 2023-01-18 ASSESSMENT — ENCOUNTER SYMPTOMS
FEVER: 0
APPETITE CHANGE: 1
RECTAL PAIN: 0
SHORTNESS OF BREATH: 0
HEMATOCHEZIA: 0
ABDOMINAL DISTENTION: 1
VOMITING: 1
NAUSEA: 1
UNEXPECTED WEIGHT CHANGE: 0
ABDOMINAL PAIN: 1
FATIGUE: 1
HEARTBURN: 0
DYSURIA: 0
DIFFICULTY URINATING: 0
DIZZINESS: 0
CHILLS: 0
DIARRHEA: 0
CONSTIPATION: 1

## 2023-01-18 ASSESSMENT — PAIN SCALES - GENERAL
PAINLEVEL: EXTREME PAIN (8)
PAINLEVEL: SEVERE PAIN (7)

## 2023-01-18 NOTE — PATIENT INSTRUCTIONS
Take entire bottle of miralax with Gatorade today with 2-4 tablets of Senakot twice a day for the next 1-2 days     Milk of magnesium every 3 days as needed      After clean out--    Daily Miralax and metamucil     25 -30 Grams of fiber every day      Your doctor recommends that you eat 25 to 30 Grams of fiber daily. The following are some examples of fiber amounts in different foods.    Fruits: Apple (with skin) 1 medium = 4.4 Grams   Banana      1 medium = 3.1 Grams   Oranges     1 orange = 3.1 Grams   Prunes     1 cup, pitted = 12.4 Grams    Juices: Apple, unsweetened w/ added ascorbic acid  1 cup = 0.5 Grams    Grapefruit, white, canned,sweetened  1 cup = 0.2 Grams    Grape, unsweetened w/ added ascorbic acid  1 cup = 0.5 Grams    Orange     1 cup = 0.7 Grams    Vegetables:   Cooked: Green Beans   1 cup = 4.0 Grams       Carrots   1/2 cup sliced = 2.3 Grams       Peas       1 cup = 8.8 Grams       Potato (baked, with skin)  1 medium = 3.8 Grams    Raw: Cucumber (with peel)  1 cucumber = 1.5 Grams            Lettuce     1 cup shredded = 0.5 Grams            Tomato   1 medium tomato = 1.5 Grams            Spinach  1 cup = 0.7 Grams    Legumes: Baked beans, canned, no salt added  1 cup = 13.9 Grams         Kidney Beans, canned  1 cup = 13.6 Grams         Real Beans, canned     1 cup = 11.6 Grams         Lentils, boiled   1 cup = 15.6 Grams    Breads, Pastas, Flours: Bran muffins   1 medium muffin = 5.2 Grams           Oatmeal, cooked  1 cup = 4.0 Grams           White Bread   1 slice = 0.6 Grams           Whole- wheat bread = 1.9 Grams    Pasta and rice, cooked: Macaroni  1 cup = 2.5 Grams           Rice, Brown  1 cup = 3.5 Grams           Rice, white   1 cup = 0.6 Grams           Spaghetti (regular) 1 cup = 2.5 Grams    Nuts: Almonds   1 cup = 17.4 Grams            Peanuts    1 cup = 12.4 Grams

## 2023-01-18 NOTE — PROGRESS NOTES
[facility] HEART CARE   CARDIOLOGY CONSULT     Izzy Lopez   [id number] [address] [id number]    No primary care provider on file.     Chief Complaint   Patient presents with     Consult     Abnormal echo        HPI:   Mrs. Lopez is a 67 year old female who presents for cardiology evaluation with recent reports of chest pain and increased exertion dyspnea. She underwent recent TTE revealing mildly depressed EF with RWMA concerning for ischemic CM.     Patient has a history notable for long standing HLD- recently started on Atorvastatin, family history of premature CAD on her dads side of the family, chronic back pain with sciatica, obesity and history of diverticulosis. She does have a history of remote tobacco use for which she quit several years ago. Patient has never been diagnosed with HTN or DM.     Patient recently endorsed left precordial chest pains, describes as a heaviness and pressure over her chest without radiation to the arm, jaw, neck or back. This has been present with exertion, also with resting. Over the last 6 months she has also noted increased exertional dyspnea. No palpitations. Positive for occasional orthostatic lightheadedness, no syncope or falls. She does describe mild chronic LE edema.     RELEVANT TESTING REVIEWED:  Echocardiogram 1/12/2023  Interpretation Summary  Left ventricular function is mildly reduced. The visually estimated ejection  fraction is 45-50%. Mild diffuse hypokinesis which is slightly more pronounced  in the inferolateral segments. Consider ischemic cardiomyopathy.  Global right ventricular function is normal.  No hemodynamically significant valve abnormalities.  The inferior vena cava is normal.  Normal diameter aortic root and proximal ascending aorta. Large atheroma noted  at the sinotubular junction.  There is no prior study for direct comparison.    PAST MEDICAL HISTORY:   Past Medical History:   Diagnosis Date     Hyperlipidemia     No Comments  Provided          FAMILY HISTORY:   Family History   Problem Relation Age of Onset     Cancer Mother 60        Cancer,lung     Other - See Comments Father 60        AAA, during repair     Other - See Comments Paternal Grandmother         GI Disease     Other - See Comments Paternal Grandfather         GI Disease     Breast Cancer Sister         Cancer-breast          PAST SURGICAL HISTORY:   Past Surgical History:   Procedure Laterality Date     COLONOSCOPY  2015    hyperplastic, follow up, 24     HYSTERECTOMY TOTAL ABDOMINAL, BILATERAL SALPINGO-OOPHORECTOMY, COMBINED      No Comments Provided     LAPAROSCOPIC TUBAL LIGATION      No Comments Provided     TONSILLECTOMY      No Comments Provided          SOCIAL HISTORY:   Social History     Socioeconomic History     Marital status:      Spouse name: None     Number of children: None     Years of education: None     Highest education level: None   Tobacco Use     Smoking status: Former     Packs/day: 0.50     Years: 5.00     Pack years: 2.50     Types: Cigarettes     Quit date: 1980     Years since quittin.0     Smokeless tobacco: Former   Vaping Use     Vaping Use: Never used   Substance and Sexual Activity     Alcohol use: No     Alcohol/week: 0.0 standard drinks     Drug use: No     Comment: Drug use: Not Asked     Sexual activity: Yes     Partners: Male   Social History Narrative    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          CURRENT MEDICATIONS:   Prior to Admission medications    Medication Sig Start Date End Date Taking? Authorizing Provider   aspirin (ASA) 81 MG EC tablet Take 1 tablet (81 mg) by mouth daily 22   Isaiah Ley MD   atorvastatin (LIPITOR) 40 MG tablet Take 1 tablet (40 mg) by mouth daily 22   Isaiah Ley MD   magnesium hydroxide (MILK OF MAGNESIA) 400 MG/5ML suspension Take 30 mLs by mouth every 72 hours as needed for constipation or heartburn 23    Antoinette Acharya APRN CNP   nystatin (MYCOSTATIN) 400410 UNIT/ML suspension Take 5 mLs (500,000 Units) by mouth 4 times daily for 7 days - swish and spit - repeat as needed for oral thrush  Patient not taking: Reported on 1/18/2023 1/13/23 1/20/23  Antoinette Acharya APRN CNP   polyethylene glycol (MIRALAX) 17 GM/Dose powder Take 17 g (1 capful) by mouth daily 1/13/23   Antoinette Acharya APRN CNP   psyllium (METAMUCIL/KONSYL) capsule Take 1 capsule by mouth daily 1/18/23 1/18/24  Antoinette Acharya APRN CNP   senna (SENOKOT) 8.6 MG tablet Take 2-4 tablets by mouth 2 times daily as needed for constipation 1/18/23   Antoinette Acharya APRN CNP          ALLERGIES:   No Known Allergies       ROS:   CONSTITUTIONAL: No reported fever or chills. No changes in weight.  ENT: No visual disturbance, ear ache, epistaxis or sore throat.   CARDIOVASCULAR: Positive for chest pressure, see HPI. No palpitations, occasional lower extremity edema.   RESPIRATORY: Positive for increased dyspnea upon exertion. No cough, wheezing or hemoptysis. No orthopnea or PND.   GI: No reported abdominal pain, melena or hematochezia. No changes in bowel habits.   : No reported hematuria or dysuria.   NEUROLOGICAL: No headache, lightheadedness, dizziness, syncope, ataxia, paresthesias or weakness.   HEMATOLOGIC: No history of anemia. No bleeding or excessive bruising. No history of blood clots.   MUSCULOSKELETAL: No new joint pain or swelling, no muscle pain. Positive for chronic back pain.   ENDOCRINOLOGIC: No temperature intolerance. No hair or skin changes.  SKIN: No abnormal rashes or sores, no unusual itching.  PSYCHIATRIC: No history of depression or anxiety. No changes in mood, feeling down or anxious. No changes in sleep.      PHYSICAL EXAM:   /76 (BP Location: Right arm, Patient Position: Sitting, Cuff Size: Adult Large)   Pulse 66   Temp 97.6  F (36.4  C) (Tympanic)   Resp 20   Wt 82.1 kg (181 lb)   SpO2 95%   BMI 34.20 kg/m     GENERAL: The patient is a well-developed, well-nourished, in no apparent distress.  HEENT: Head is normocephalic and atraumatic. Eyes are symmetrical with normal visual tracking. No icterus, no xanthelasmas. Nares appeared normal without nasal drainage. Mucous membranes are moist, no cyanosis.  NECK: Supple, no cervical bruits, JVP not visible.   CHEST/ LUNGS: Lungs clear to auscultation, no rales, rhonchi or wheezes, no use of accessory muscles, no retractions, respirations unlabored and normal respiratory rate.   CARDIO: Regular rate and rhythm normal with S1 and S2, no S3 or S4 and no murmur, click or rub. Precordium quiet with normal PMI.    ABD: Abdomen is nondistended.  EXTREMITIES: No edema present.  MUSCULOSKELETAL: No joint swelling.   NEUROLOGIC: Alert and oriented X3. Normal speech, gait and affect. No focal neurologic deficits.   SKIN: No jaundice. No rashes or visible skin lesions present. No ecchymosis.     EKG:    Sinus bradycardia, rate 59 bpm, no ST-T changes.     LAB RESULTS:   Office Visit on 01/13/2023   Component Date Value Ref Range Status     Color Urine 01/13/2023 Light Yellow  Colorless, Straw, Light Yellow, Yellow Final     Appearance Urine 01/13/2023 Clear  Clear Final     Glucose Urine 01/13/2023 Negative  Negative mg/dL Final     Bilirubin Urine 01/13/2023 Negative  Negative Final     Ketones Urine 01/13/2023 Negative  Negative mg/dL Final     Specific Gravity Urine 01/13/2023 1.012  1.000 - 1.030 Final     Blood Urine 01/13/2023 Small (A)  Negative Final     pH Urine 01/13/2023 6.0  5.0 - 9.0 Final     Protein Albumin Urine 01/13/2023 Negative  Negative mg/dL Final     Urobilinogen Urine 01/13/2023 Normal  Normal, 2.0 mg/dL Final     Nitrite Urine 01/13/2023 Negative  Negative Final     Leukocyte Esterase Urine 01/13/2023 Negative  Negative Final     Bacteria Urine 01/13/2023 Few (A)  None Seen /HPF Final     Mucus Urine 01/13/2023 Present (A)  None Seen /LPF Final     RBC Urine  01/13/2023 2  <=2 /HPF Final     WBC Urine 01/13/2023 1  <=5 /HPF Final     Sodium 01/13/2023 133 (L)  136 - 145 mmol/L Final     Potassium 01/13/2023 4.7  3.4 - 5.3 mmol/L Final     Chloride 01/13/2023 98  98 - 107 mmol/L Final     Carbon Dioxide (CO2) 01/13/2023 25  22 - 29 mmol/L Final     Anion Gap 01/13/2023 10  7 - 15 mmol/L Final     Urea Nitrogen 01/13/2023 17.7  8.0 - 23.0 mg/dL Final     Creatinine 01/13/2023 1.00 (H)  0.51 - 0.95 mg/dL Final     Calcium 01/13/2023 9.2  8.8 - 10.2 mg/dL Final     Glucose 01/13/2023 87  70 - 99 mg/dL Final     Alkaline Phosphatase 01/13/2023 80  35 - 104 U/L Final     AST 01/13/2023 16  10 - 35 U/L Final     ALT 01/13/2023 11  10 - 35 U/L Final     Protein Total 01/13/2023 7.4  6.4 - 8.3 g/dL Final     Albumin 01/13/2023 3.7  3.5 - 5.2 g/dL Final     Bilirubin Total 01/13/2023 0.6  <=1.2 mg/dL Final     GFR Estimate 01/13/2023 61  >60 mL/min/1.73m2 Final     CRP Inflammation 01/13/2023 91.14 (H)  <5.00 mg/L Final     Lipase 01/13/2023 34  13 - 60 U/L Final     Amylase 01/13/2023 59  28 - 100 U/L Final     WBC Count 01/13/2023 12.4 (H)  4.0 - 11.0 10e3/uL Final     RBC Count 01/13/2023 4.57  3.80 - 5.20 10e6/uL Final     Hemoglobin 01/13/2023 13.6  11.7 - 15.7 g/dL Final     Hematocrit 01/13/2023 41.3  35.0 - 47.0 % Final     MCV 01/13/2023 90  78 - 100 fL Final     MCH 01/13/2023 29.8  26.5 - 33.0 pg Final     MCHC 01/13/2023 32.9  31.5 - 36.5 g/dL Final     RDW 01/13/2023 12.2  10.0 - 15.0 % Final     Platelet Count 01/13/2023 401  150 - 450 10e3/uL Final     % Neutrophils 01/13/2023 55  % Final     % Lymphocytes 01/13/2023 32  % Final     % Monocytes 01/13/2023 11  % Final     % Eosinophils 01/13/2023 2  % Final     % Basophils 01/13/2023 0  % Final     % Immature Granulocytes 01/13/2023 0  % Final     NRBCs per 100 WBC 01/13/2023 0  <1 /100 Final     Absolute Neutrophils 01/13/2023 6.8  1.6 - 8.3 10e3/uL Final     Absolute Lymphocytes 01/13/2023 3.9  0.8 - 5.3 10e3/uL  Final     Absolute Monocytes 01/13/2023 1.3  0.0 - 1.3 10e3/uL Final     Absolute Eosinophils 01/13/2023 0.3  0.0 - 0.7 10e3/uL Final     Absolute Basophils 01/13/2023 0.0  0.0 - 0.2 10e3/uL Final     Absolute Immature Granulocytes 01/13/2023 0.1  <=0.4 10e3/uL Final     Absolute NRBCs 01/13/2023 0.0  10e3/uL Final     Hold Specimen 01/13/2023 [facility]   Final   Office Visit on 01/03/2023   Component Date Value Ref Range Status     Sodium 01/03/2023 138  136 - 145 mmol/L Final     Potassium 01/03/2023 5.1  3.4 - 5.3 mmol/L Final     Chloride 01/03/2023 104  98 - 107 mmol/L Final     Carbon Dioxide (CO2) 01/03/2023 28  22 - 29 mmol/L Final     Anion Gap 01/03/2023 6 (L)  7 - 15 mmol/L Final     Urea Nitrogen 01/03/2023 14.8  8.0 - 23.0 mg/dL Final     Creatinine 01/03/2023 0.97 (H)  0.51 - 0.95 mg/dL Final     Calcium 01/03/2023 9.7  8.8 - 10.2 mg/dL Final     Glucose 01/03/2023 91  70 - 99 mg/dL Final     GFR Estimate 01/03/2023 63  >60 mL/min/1.73m2 Final     Magnesium 01/03/2023 2.0  1.7 - 2.3 mg/dL Final     TSH 01/03/2023 1.40  0.30 - 4.20 uIU/mL Final   Office Visit on 12/14/2022   Component Date Value Ref Range Status     Sodium 12/14/2022 135 (L)  136 - 145 mmol/L Final     Potassium 12/14/2022 5.0  3.4 - 5.3 mmol/L Final     Chloride 12/14/2022 100  98 - 107 mmol/L Final     Carbon Dioxide (CO2) 12/14/2022 24  22 - 29 mmol/L Final     Anion Gap 12/14/2022 11  7 - 15 mmol/L Final     Urea Nitrogen 12/14/2022 23.5 (H)  8.0 - 23.0 mg/dL Final     Creatinine 12/14/2022 1.14 (H)  0.51 - 0.95 mg/dL Final     Calcium 12/14/2022 9.6  8.8 - 10.2 mg/dL Final     Glucose 12/14/2022 94  70 - 99 mg/dL Final     Alkaline Phosphatase 12/14/2022 98  35 - 104 U/L Final     AST 12/14/2022 22  10 - 35 U/L Final     ALT 12/14/2022 24  10 - 35 U/L Final     Protein Total 12/14/2022 7.5  6.4 - 8.3 g/dL Final     Albumin 12/14/2022 4.5  3.5 - 5.2 g/dL Final     Bilirubin Total 12/14/2022 0.3  <=1.2 mg/dL Final     GFR Estimate  12/14/2022 52 (L)  >60 mL/min/1.73m2 Final     Troponin T, High Sensitivity 12/14/2022 7  <=14 ng/L Final     Cholesterol 12/14/2022 291 (H)  <200 mg/dL Final     Triglycerides 12/14/2022 167 (H)  <150 mg/dL Final     Direct Measure HDL 12/14/2022 54  >=50 mg/dL Final     LDL Cholesterol Calculated 12/14/2022 204 (H)  <=100 mg/dL Final     Non HDL Cholesterol 12/14/2022 237 (H)  <130 mg/dL Final     WBC Count 12/14/2022 11.6 (H)  4.0 - 11.0 10e3/uL Final     RBC Count 12/14/2022 5.18  3.80 - 5.20 10e6/uL Final     Hemoglobin 12/14/2022 15.8 (H)  11.7 - 15.7 g/dL Final     Hematocrit 12/14/2022 47.1 (H)  35.0 - 47.0 % Final     MCV 12/14/2022 91  78 - 100 fL Final     MCH 12/14/2022 30.5  26.5 - 33.0 pg Final     MCHC 12/14/2022 33.5  31.5 - 36.5 g/dL Final     RDW 12/14/2022 12.5  10.0 - 15.0 % Final     Platelet Count 12/14/2022 340  150 - 450 10e3/uL Final     % Neutrophils 12/14/2022 54  % Final     % Lymphocytes 12/14/2022 36  % Final     % Monocytes 12/14/2022 7  % Final     % Eosinophils 12/14/2022 1  % Final     % Basophils 12/14/2022 1  % Final     % Immature Granulocytes 12/14/2022 1  % Final     NRBCs per 100 WBC 12/14/2022 0  <1 /100 Final     Absolute Neutrophils 12/14/2022 6.4  1.6 - 8.3 10e3/uL Final     Absolute Lymphocytes 12/14/2022 4.2  0.8 - 5.3 10e3/uL Final     Absolute Monocytes 12/14/2022 0.8  0.0 - 1.3 10e3/uL Final     Absolute Eosinophils 12/14/2022 0.1  0.0 - 0.7 10e3/uL Final     Absolute Basophils 12/14/2022 0.1  0.0 - 0.2 10e3/uL Final     Absolute Immature Granulocytes 12/14/2022 0.1  <=0.4 10e3/uL Final     Absolute NRBCs 12/14/2022 0.0  10e3/uL Final          ASSESSMENT:   Izzy Lopez presents for cardiology evaluation with recent reports of chest pain and increased exertion dyspnea. She underwent recent TTE revealing mildly depressed EF with RWMA concerning for ischemic CM.   Patient recently endorsed left precordial chest pains, describes as a heaviness and pressure over her  chest without radiation to the arm, jaw, neck or back. This has been present with exertion, also with resting. Over the last 6 months she has also noted increased exertional dyspnea. No palpitations. Positive for occasional orthostatic lightheadedness, no syncope or falls. She does describe mild chronic LE edema.     1. Depressed left ventricular ejection fraction  2. Regional wall motion abnormality of heart  3. BAY (dyspnea on exertion)  4. Hypercholesteremia  5. Family history of ischemic heart disease  6. Chest pressure    PLAN:   1. Patient with recent progressive anginal symptoms. Abnormal echocardiogram on 1/12/2023 revealing mildly depressed EF at 45-50% with RWMA (inferolateral), findings concerning for ischemic CM.  2. Risk factors include personal history of longstanding HLD not on statin until just recently, family history premature CAD and history of tobacco abuse.   3. Referral for coronary angiography recommended, initially scheduled at [facility]. Patient called back, concerned too far to drive all the way to [place] and requested referral to [facility] in [place]. She will follow-up with local cardiology team 1-2 weeks s/p coronary angiography. She will remain on ASA 81 mg daily.     Thank you for allowing me to participate in the care of your patient. Please do not hesitate to contact me if you have any questions.     Total time 51 min on date of encounter spent reviewing records, face to face time obtaining HPI, physical exam, reviewing results of recent testing and counseling on the above diagnoses and recommended plan of care.     Rebecca Porter, APRN CNP [facility]

## 2023-01-18 NOTE — NURSING NOTE
"Chief Complaint   Patient presents with     Consult     Abnormal echo       Initial /76 (BP Location: Right arm, Patient Position: Sitting, Cuff Size: Adult Large)   Pulse 66   Temp 97.6  F (36.4  C) (Tympanic)   Resp 20   Wt 82.1 kg (181 lb)   SpO2 95%   BMI 34.20 kg/m   Estimated body mass index is 34.2 kg/m  as calculated from the following:    Height as of an earlier encounter on 1/18/23: 1.549 m (5' 1\").    Weight as of this encounter: 82.1 kg (181 lb).  Meds Reconciled: complete  Pt is on Aspirin  Pt is on a Statin  PHQ and/or MAGALY reviewed. Pt referred to PCP/MH Provider as appropriate.    Tiffany Osborn LPN      "

## 2023-01-18 NOTE — PROGRESS NOTES
Assessment & Plan   Izzy Lopez is a 67 year old presenting for the following health issues:      ICD-10-CM    1. Lower abdominal pain  R10.30 psyllium (METAMUCIL/KONSYL) capsule     magnesium hydroxide (MILK OF MAGNESIA) 400 MG/5ML suspension     senna (SENOKOT) 8.6 MG tablet      2. Diverticulosis of sigmoid colon  K57.30 psyllium (METAMUCIL/KONSYL) capsule     magnesium hydroxide (MILK OF MAGNESIA) 400 MG/5ML suspension     senna (SENOKOT) 8.6 MG tablet      3. Constipation, unspecified constipation type  K59.00 psyllium (METAMUCIL/KONSYL) capsule     magnesium hydroxide (MILK OF MAGNESIA) 400 MG/5ML suspension     senna (SENOKOT) 8.6 MG tablet        Consulted with general surgery regarding patient's ongoing constipation and diverticulosis history.  Recommendation was given for patient to take entire can of MiraLAX mixed in Gatorade, 2 to 4 tablets of Senokot twice daily for the next 2 to 3 days and milk of magnesia every 3 days as needed for constipation.  After she has a cleanout of her bowels she will be on MiraLAX and Metamucil chronically for her diverticulosis.  Instructed patient that she needs to have a bowel cleanout and then we will reassess symptoms.  Patient agreeable with plan.        Encouraged weight loss and regular exercise.     Return if symptoms worsen or fail to improve.    AKBAR Braxton [facility]    Mary Magana is a 67 year old, presenting for the following health issues:    Patient presents to clinic with continued lower abdominal pain.  She states that she has been taking MiraLAX twice daily as prescribed but that mag citrate was recalled and so she was not able to start that.  She has been eating oatmeal, toast, apples, burritos.  She continues to have constipation she only has had 1 bowel movement since last Thursday, states that it is textured and not completely formed.  She has had previous intermittent nausea and vomiting while on the  "toilet when she is unable to defecate.  Denies any fevers does have poor appetite has lost 5 pounds recently.  She has had a CT scan consistent with constipation and and diverticulosis without signs of diverticulitis.  She states that she has been drinking adequate amount of water.        Pain  Associated symptoms include abdominal pain, fatigue ( struggles to sleep due to pain), nausea and vomiting ( 3 episodes while going to the bathroom). Pertinent negatives include no chest pain, chills or fever.      Chief Complaint   Patient presents with     Pain     Lower abdominal pain for over a month. Loosing weight, decreased appetite, bowel movements are soft and stringy and is not emptying out.       Initial /76 (BP Location: Right arm, Patient Position: Sitting, Cuff Size: Adult Regular)   Pulse 66   Temp 97.6  F (36.4  C)   Resp 20   Ht 1.549 m (5' 1\")   Wt 82.2 kg (181 lb 5 oz)   SpO2 95%   BMI 34.26 kg/m   Estimated body mass index is 34.26 kg/m  as calculated from the following:    Height as of this encounter: 1.549 m (5' 1\").    Weight as of this encounter: 82.2 kg (181 lb 5 oz).  Medication Reconciliation: complete    FOOD SECURITY SCREENING QUESTIONS  Hunger Vital Signs:  Within the past 12 months we worried whether our food would run out before we got money to buy more. Never  Within the past 12 months the food we bought just didn't last and we didn't have money to get more. Never  Tami Viveros LPN 1/18/2023 8:12 AM       AKBAR Braxton CNP        Review of Systems   Constitutional: Positive for appetite change ( no appetite ) and fatigue ( struggles to sleep due to pain). Negative for chills, fever and unexpected weight change.   Respiratory: Negative for shortness of breath.    Cardiovascular: Negative for chest pain.   Gastrointestinal: Positive for abdominal distention ( bloated), abdominal pain, constipation (1 bowel movement since Thursday), nausea and vomiting ( 3 episodes while " "going to the bathroom). Negative for diarrhea, heartburn, hematochezia and rectal pain.   Genitourinary: Negative for difficulty urinating, dysuria and pelvic pain.   Allergic/Immunologic: Negative for food allergies and immunocompromised state.   Neurological: Negative for dizziness.   All other systems reviewed and are negative.           Objective    /76 (BP Location: Right arm, Patient Position: Sitting, Cuff Size: Adult Regular)   Pulse 66   Temp 97.6  F (36.4  C)   Resp 20   Ht 1.549 m (5' 1\")   Wt 82.2 kg (181 lb 5 oz)   SpO2 95%   BMI 34.26 kg/m    Body mass index is 34.26 kg/m .  Physical Exam  Constitutional:       General: She is in acute distress.      Appearance: Normal appearance. She is not ill-appearing or toxic-appearing.   HENT:      Head: Normocephalic and atraumatic.   Abdominal:      General: There is distension.      Palpations: There is no mass.      Tenderness: There is abdominal tenderness.      Hernia: No hernia is present.   Neurological:      Mental Status: She is alert.                            "

## 2023-01-18 NOTE — PATIENT INSTRUCTIONS
You were seen by  AKBAR Bennett CNP    1. Stay on your 81 mg Asprin    2. Angiogram scheduled today      You will follow up with [facility] in 1 week after your angiogram  , sooner if needed.       Please call the cardiology office with problems, questions, or concerns at [phone].    If you experience chest pain, chest pressure, chest tightness, shortness of breath, fainting, lightheadedness, nausea, vomiting, or other concerning symptoms, please report to the Emergency Department or call 911. These symptoms may be emergent, and best treated in the Emergency Department.     Cardiology Nurses  ADALID Rooney, ADALID Jade, LPN  Tiffany, LPN  [facility] ([facility] [address])  [phone]

## 2023-01-18 NOTE — Clinical Note
Could you send referral to [facility] for coronary angiography? Thanks,  AKBAR Schofield CNP

## 2023-01-18 NOTE — TELEPHONE ENCOUNTER
"Per AKBAR Bennett CNP, pt to have COR Angiogram at [facility]. Reviewed allergies, labs, and medications.     -Pt will continue on Aspirin 81 mg daily, including day of procedure.     -Pt will HOLD all vitamins and minerals the day prior     Angiogram teaching done using the \"Coronary Angiogram, Angioplasty and Stent Placement\" patient guide provided by the [facility].  Packet given, instructions reviewed, questions answered.  Pt verbalizes understanding. Now scheduled for 2/2/23 at 11:30am.  Pt is agreeable to plan.    Nevin Kilgore RN on 1/18/2023 at 4:45 PM    "

## 2023-01-19 ENCOUNTER — HOSPITAL ENCOUNTER (OUTPATIENT)
Facility: CLINIC | Age: 68
End: 2023-01-19
Attending: INTERNAL MEDICINE | Admitting: INTERNAL MEDICINE
Payer: MEDICARE

## 2023-01-19 ENCOUNTER — TELEPHONE (OUTPATIENT)
Dept: CARDIOLOGY | Facility: OTHER | Age: 68
End: 2023-01-19
Payer: COMMERCIAL

## 2023-01-19 DIAGNOSIS — R93.1 REGIONAL WALL MOTION ABNORMALITY OF HEART: ICD-10-CM

## 2023-01-19 DIAGNOSIS — R07.89 CHEST PRESSURE: ICD-10-CM

## 2023-01-19 DIAGNOSIS — R06.09 DOE (DYSPNEA ON EXERTION): ICD-10-CM

## 2023-01-19 DIAGNOSIS — R09.89 DEPRESSED LEFT VENTRICULAR EJECTION FRACTION: ICD-10-CM

## 2023-01-19 DIAGNOSIS — Z82.49 FAMILY HISTORY OF ISCHEMIC HEART DISEASE: ICD-10-CM

## 2023-01-19 DIAGNOSIS — E78.00 HYPERCHOLESTEREMIA: ICD-10-CM

## 2023-01-19 NOTE — TELEPHONE ENCOUNTER
"Called and spoke to Patient after verifying last name and date of birth.  Talked with Patient to see if another date would work for her. Patient stated \"No she really would just like to have it done in [place] at [facility] because it is easier for her to find a ride\"     Will route to AKBAR Bennett CNP to have angiogram referral transferred to [facility] in [place].     Nevin Kilgore RN on 1/19/2023 at 9:35 AM    "

## 2023-01-19 NOTE — TELEPHONE ENCOUNTER
Patient called and stated that she is unable to find a ride to the [facility] for her angiogram on 02/02.  She is requesting that Rebecca set her up with an appointment in [place] instead.  She also states that dates of 01/25, 01/26, and 01/27 would not work for her.    Patient states Rebecca is the one who scheduled her angiogram in the [facility], and she is requesting she do this again for [place] instead.    Katheryn Calderon on 1/19/2023 at 9:20 AM'

## 2023-01-20 PROBLEM — R09.89 DEPRESSED LEFT VENTRICULAR EJECTION FRACTION: Status: ACTIVE | Noted: 2023-01-20

## 2023-01-20 PROBLEM — R93.1 DEPRESSED LEFT VENTRICULAR EJECTION FRACTION: Status: ACTIVE | Noted: 2023-01-20

## 2023-01-23 NOTE — TELEPHONE ENCOUNTER
"Per Rebecca Porter, APRN CNP  \"I did place referral and messaged Nandini in scheduling to send referral. Can you check on Monday to make sure this did get sent out this last Friday (1/20/2023.)   Thanks,   Rebecca     Spoke and checked with [facility] [address]   and they verified that referral will be sent out today.     Nevin Kilgore RN on 1/23/2023 at 2:37 PM    "

## 2023-02-01 ENCOUNTER — TRANSFERRED RECORDS (OUTPATIENT)
Dept: HEALTH INFORMATION MANAGEMENT | Facility: OTHER | Age: 68
End: 2023-02-01
Payer: COMMERCIAL

## 2023-02-03 ENCOUNTER — TELEPHONE (OUTPATIENT)
Dept: CARDIAC REHAB | Facility: OTHER | Age: 68
End: 2023-02-03
Payer: COMMERCIAL

## 2023-02-03 DIAGNOSIS — Z95.5 S/P CORONARY ARTERY STENT PLACEMENT: Primary | ICD-10-CM

## 2023-02-03 NOTE — TELEPHONE ENCOUNTER
Patient verified their .   patient returned our call.  re: referral to cardiac rehab received from [facility].  Instructed on program and goals of cardiac rehab.  Appointment set for 23.  Patient verbalized understanding.

## 2023-02-07 ENCOUNTER — HOSPITAL ENCOUNTER (OUTPATIENT)
Dept: CARDIAC REHAB | Facility: OTHER | Age: 68
Discharge: HOME | End: 2023-02-07
Attending: STUDENT IN AN ORGANIZED HEALTH CARE EDUCATION/TRAINING PROGRAM
Payer: MEDICARE

## 2023-02-07 DIAGNOSIS — Z95.5 S/P CORONARY ARTERY STENT PLACEMENT: ICD-10-CM

## 2023-02-07 PROCEDURE — 93798 PHYS/QHP OP CAR RHAB W/ECG: CPT

## 2023-02-07 RX ORDER — CLOPIDOGREL BISULFATE 75 MG/1
1 TABLET ORAL
COMMUNITY
Start: 2023-02-01 | End: 2023-05-30

## 2023-02-07 RX ORDER — METOPROLOL SUCCINATE 25 MG/1
1 TABLET, EXTENDED RELEASE ORAL
COMMUNITY
Start: 2023-02-01 | End: 2023-05-30

## 2023-02-07 ASSESSMENT — PATIENT HEALTH QUESTIONNAIRE - PHQ9: SUM OF ALL RESPONSES TO PHQ QUESTIONS 1-9: 0

## 2023-02-08 ENCOUNTER — HOSPITAL ENCOUNTER (OUTPATIENT)
Dept: CARDIAC REHAB | Facility: OTHER | Age: 68
Discharge: HOME | End: 2023-02-08
Attending: NURSE PRACTITIONER
Payer: MEDICARE

## 2023-02-08 PROCEDURE — 93797 PHYS/QHP OP CAR RHAB WO ECG: CPT | Mod: XU

## 2023-02-08 PROCEDURE — 93798 PHYS/QHP OP CAR RHAB W/ECG: CPT

## 2023-02-10 ENCOUNTER — HOSPITAL ENCOUNTER (OUTPATIENT)
Dept: CARDIAC REHAB | Facility: OTHER | Age: 68
Discharge: HOME | End: 2023-02-10
Attending: NURSE PRACTITIONER
Payer: MEDICARE

## 2023-02-10 PROCEDURE — 93798 PHYS/QHP OP CAR RHAB W/ECG: CPT

## 2023-02-13 ENCOUNTER — HOSPITAL ENCOUNTER (OUTPATIENT)
Dept: CARDIAC REHAB | Facility: OTHER | Age: 68
Discharge: HOME | End: 2023-02-13
Attending: NURSE PRACTITIONER
Payer: MEDICARE

## 2023-02-13 PROCEDURE — 93798 PHYS/QHP OP CAR RHAB W/ECG: CPT

## 2023-02-15 ENCOUNTER — OFFICE VISIT (OUTPATIENT)
Dept: CARDIOLOGY | Facility: OTHER | Age: 68
End: 2023-02-15
Attending: NURSE PRACTITIONER
Payer: COMMERCIAL

## 2023-02-15 ENCOUNTER — HOSPITAL ENCOUNTER (OUTPATIENT)
Dept: CARDIAC REHAB | Facility: OTHER | Age: 68
Discharge: HOME | End: 2023-02-15
Attending: NURSE PRACTITIONER
Payer: MEDICARE

## 2023-02-15 VITALS
DIASTOLIC BLOOD PRESSURE: 56 MMHG | SYSTOLIC BLOOD PRESSURE: 108 MMHG | TEMPERATURE: 96.6 F | OXYGEN SATURATION: 96 % | WEIGHT: 178 LBS | RESPIRATION RATE: 16 BRPM | BODY MASS INDEX: 33.63 KG/M2 | HEART RATE: 72 BPM

## 2023-02-15 DIAGNOSIS — Z95.5 STATUS POST INSERTION OF DRUG-ELUTING STENT INTO LEFT ANTERIOR DESCENDING (LAD) ARTERY: Primary | ICD-10-CM

## 2023-02-15 DIAGNOSIS — I50.21 ACUTE HFREF (HEART FAILURE WITH REDUCED EJECTION FRACTION) (H): ICD-10-CM

## 2023-02-15 DIAGNOSIS — E78.00 HYPERCHOLESTEREMIA: ICD-10-CM

## 2023-02-15 DIAGNOSIS — E78.5 HYPERLIPIDEMIA LDL GOAL <70: ICD-10-CM

## 2023-02-15 DIAGNOSIS — R09.89 DEPRESSED LEFT VENTRICULAR EJECTION FRACTION: ICD-10-CM

## 2023-02-15 DIAGNOSIS — R93.1 REGIONAL WALL MOTION ABNORMALITY OF HEART: ICD-10-CM

## 2023-02-15 PROCEDURE — 99214 OFFICE O/P EST MOD 30 MIN: CPT | Performed by: NURSE PRACTITIONER

## 2023-02-15 PROCEDURE — G0463 HOSPITAL OUTPT CLINIC VISIT: HCPCS

## 2023-02-15 PROCEDURE — 93798 PHYS/QHP OP CAR RHAB W/ECG: CPT

## 2023-02-15 PROCEDURE — 93797 PHYS/QHP OP CAR RHAB WO ECG: CPT

## 2023-02-15 ASSESSMENT — PAIN SCALES - GENERAL: PAINLEVEL: MILD PAIN (2)

## 2023-02-15 NOTE — PROGRESS NOTES
[facility] HEART CARE   CARDIOLOGY PROGRESS NOTE    Izzy Lopez   [address] [address] [id number]    No primary care provider on file.     Chief Complaint   Patient presents with     Follow Up     S/p angio        HPI:   Mrs. Lopez is a 67 year old female who presents for cardiology follow-up to visit on 1/18/2023.  S/p coronary angiography on 2/1/2023 resulting in PCI of the LAD.  Patient was initially evaluated by cardiology on 1/18/2023 endorsing chest pain and increased exertion dyspnea. She underwent TTE revealing mildly depressed EF with RWMA concerning for ischemic CM.     Patient has a history notable for long standing HLD- recently started on Atorvastatin, family history of premature CAD on her dads side of the family, chronic back pain with sciatica, obesity and history of diverticulosis. She does have a history of remote tobacco use for which she quit several years ago. Patient has never been diagnosed with HTN or DM.     S/p coronary angiography at [facility] on 2/1/2023 by Dr. Pang.  She was identified to have severe single-vessel CAD, focal 90% stenosis of the pLAD treated with single LINDA (3.0 x 12 mm Synergy).  Discharged on DAPT of aspirin 81 mg daily and Plavix 75 mg daily for optimally 1 year, she will remain on baby ASA life long following LINDA.  Continue on high intensity statin and started on low-dose beta-blocker for cardiac protection.    INTERVAL HISTORY:  Today, patient describes feeling the best she has felt in over one year. Energy level significantly improved, no chest pain or pressure. No dyspnea. No palpitations, lightheadedness or syncope.     RELEVANT TESTING REVIEWED:  Echocardiogram 1/12/2023  Interpretation Summary  Left ventricular function is mildly reduced. The visually estimated ejection  fraction is 45-50%. Mild diffuse hypokinesis which is slightly more pronounced  in the inferolateral segments. Consider ischemic cardiomyopathy.  Global right  ventricular function is normal.  No hemodynamically significant valve abnormalities.  The inferior vena cava is normal.  Normal diameter aortic root and proximal ascending aorta. Large atheroma noted  at the sinotubular junction.  There is no prior study for direct comparison.    PAST MEDICAL HISTORY:   Past Medical History:   Diagnosis Date     Hyperlipidemia     No Comments Provided          FAMILY HISTORY:   Family History   Problem Relation Age of Onset     Cancer Mother 60        Cancer,lung     Other - See Comments Father 60        AAA, during repair     Other - See Comments Paternal Grandmother         GI Disease     Other - See Comments Paternal Grandfather         GI Disease     Breast Cancer Sister         Cancer-breast          PAST SURGICAL HISTORY:   Past Surgical History:   Procedure Laterality Date     COLONOSCOPY  2015    hyperplastic, follow up, 24     HYSTERECTOMY TOTAL ABDOMINAL, BILATERAL SALPINGO-OOPHORECTOMY, COMBINED      No Comments Provided     LAPAROSCOPIC TUBAL LIGATION      No Comments Provided     TONSILLECTOMY      No Comments Provided          SOCIAL HISTORY:   Social History     Socioeconomic History     Marital status:      Spouse name: None     Number of children: None     Years of education: None     Highest education level: None   Tobacco Use     Smoking status: Former     Packs/day: 0.50     Years: 5.00     Pack years: 2.50     Types: Cigarettes     Quit date: 1980     Years since quittin.0     Smokeless tobacco: Former   Vaping Use     Vaping Use: Never used   Substance and Sexual Activity     Alcohol use: No     Alcohol/week: 0.0 standard drinks     Drug use: No     Comment: Drug use: Not Asked     Sexual activity: Yes     Partners: Male   Social History Narrative    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          CURRENT MEDICATIONS:   Current Outpatient Medications   Medication     aspirin (ASA) 81 MG EC tablet      atorvastatin (LIPITOR) 40 MG tablet     clopidogrel (PLAVIX) 75 MG tablet     magnesium hydroxide (MILK OF MAGNESIA) 400 MG/5ML suspension     metoprolol succinate ER (TOPROL XL) 25 MG 24 hr tablet     polyethylene glycol (MIRALAX) 17 GM/Dose powder     psyllium (METAMUCIL/KONSYL) capsule     senna (SENOKOT) 8.6 MG tablet     No current facility-administered medications for this visit.       ALLERGIES:   No Known Allergies       ROS:   CONSTITUTIONAL: No reported fever or chills. No changes in weight.  ENT: No visual disturbance, ear ache, epistaxis or sore throat.   CARDIOVASCULAR: Chest pains resolved following PCI. No palpitations, no edema.   RESPIRATORY: BAY resolved. No cough, wheezing or hemoptysis. No orthopnea or PND.   GI: No reported abdominal pain, melena or hematochezia. No changes in bowel habits.   : No reported hematuria or dysuria.   NEUROLOGICAL: No headache, lightheadedness, dizziness, syncope, ataxia, paresthesias or weakness.   HEMATOLOGIC: No history of anemia. No bleeding or excessive bruising. No history of blood clots.   MUSCULOSKELETAL: No new joint pain or swelling, no muscle pain. Positive for chronic back pain.   ENDOCRINOLOGIC: No temperature intolerance. No hair or skin changes.  SKIN: No abnormal rashes or sores, no unusual itching.  PSYCHIATRIC: No history of depression or anxiety. No changes in mood, feeling down or anxious. No changes in sleep.      PHYSICAL EXAM:   /56 (BP Location: Right arm, Patient Position: Sitting, Cuff Size: Adult Regular)   Pulse 72   Temp (!) 96.6  F (35.9  C) (Tympanic)   Resp 16   Wt 80.7 kg (178 lb)   SpO2 96%   BMI 33.63 kg/m    GENERAL: The patient is a well-developed, well-nourished, in no apparent distress.  HEENT: Head is normocephalic and atraumatic. Eyes are symmetrical with normal visual tracking. No icterus, no xanthelasmas. Nares appeared normal without nasal drainage. Mucous membranes are moist, no cyanosis.  NECK: Supple,  no cervical bruits, JVP not visible.   CHEST/ LUNGS: Lungs clear to auscultation, no rales, rhonchi or wheezes, no use of accessory muscles, no retractions, respirations unlabored and normal respiratory rate.   CARDIO: Regular rate and rhythm normal with S1 and S2, no S3 or S4 and no murmur, click or rub. Precordium quiet with normal PMI.    ABD: Abdomen is nondistended.  EXTREMITIES: No edema present.  MUSCULOSKELETAL: No joint swelling.   NEUROLOGIC: Alert and oriented X3. Normal speech, gait and affect. No focal neurologic deficits.   SKIN: No jaundice. No rashes or visible skin lesions present. No ecchymosis.     LAB RESULTS:   Office Visit on 01/18/2023   Component Date Value Ref Range Status     Ventricular Rate 01/18/2023 59  BPM Final     Atrial Rate 01/18/2023 59  BPM Final     NC Interval 01/18/2023 132  ms Final     QRS Duration 01/18/2023 86  ms Final     QT 01/18/2023 430  ms Final     QTc 01/18/2023 425  ms Final     P Axis 01/18/2023 50  degrees Final     R AXIS 01/18/2023 42  degrees Final     T Axis 01/18/2023 51  degrees Final     Interpretation ECG 01/18/2023    Final                    Value:Sinus bradycardia  Otherwise normal ECG  When compared with ECG of 14-DEC-2022 10:22,  No significant change was found  Confirmed by MD SCHULTE DANIEL ([id number]) on 1/18/2023 6:04:53 PM         ASSESSMENT:   Izzy Lopez presents for cardiology follow-up to visit on 1/18/2023.  S/p coronary angiography on 2/1/2023 resulting in PCI of the LAD.  Patient was initially evaluated by cardiology on 1/18/2023 endorsing chest pain and increased exertion dyspnea. She underwent TTE revealing mildly depressed EF with RWMA concerning for ischemic CM.   Today, patient describes feeling the best she has felt in over one year. Energy level significantly improved, no chest pain or pressure. No dyspnea. No palpitations, lightheadedness or syncope.     1. Status post insertion of drug-eluting stent into left anterior descending  (LAD) artery- 2/1/2023 ([facility])  2. Depressed left ventricular ejection fraction  3. Hypercholesteremia  4. Hyperlipidemia LDL goal <70  5. Regional wall motion abnormality of heart      PLAN:   1. S/p coronary angiography at [facility] on 2/1/2023 by Dr. Pang.  She was identified to have severe single-vessel CAD, focal 90% stenosis of the pLAD treated with single LINDA (3.0 x 12 mm Synergy).    2. Continue DAPT of aspirin 81 mg daily and Plavix 75 mg daily for optimally 1 year (2/2024). She will remain on baby 81 mg ASA life long following LINDA.  Continue on high intensity statin with goal LDL <70.   3. Continue on Toprol XL for cardiac protection and mildly reduced LV systolic function due to ischemic heart disease.   4. Echocardiogram on 1/12/2023 revealing mildly depressed EF at 45-50% with RWMA (inferolateral), findings concerning for ischemic CM. Repeat TTE in 3 months following PCI to reassess recovery of heart function.  5. Continue with phase II cardiac rehab.  Orders Placed This Encounter   Procedures     Lipid Profile     Echocardiogram Complete         Thank you for allowing me to participate in the care of your patient. Please do not hesitate to contact me if you have any questions.     Rebecca Porter, APRN CNP [facility]

## 2023-02-15 NOTE — PATIENT INSTRUCTIONS
Continue on Plavix 75 mg and Aspirin 81 mg daily until February 2024. You will then stop Plavix and continue Aspirin 81 mg life long.   Return for fasting lab this Friday morning prior to cardiac rehab.  Echocardiogram to reassess heart function in 3 months.   Follow-up with cardiology in one year, sooner if needed.

## 2023-02-15 NOTE — NURSING NOTE
"Chief Complaint   Patient presents with     Follow Up     S/p angio       Initial /56 (BP Location: Right arm, Patient Position: Sitting, Cuff Size: Adult Regular)   Pulse 72   Temp (!) 96.6  F (35.9  C) (Tympanic)   Resp 16   Wt 80.7 kg (178 lb)   SpO2 96%   BMI 33.63 kg/m   Estimated body mass index is 33.63 kg/m  as calculated from the following:    Height as of 1/18/23: 1.549 m (5' 1\").    Weight as of this encounter: 80.7 kg (178 lb).  Meds Reconciled: complete  Pt is not on Aspirin  Pt is not on a Statin  PHQ and/or MAGALY reviewed. Pt referred to PCP/MH Provider as appropriate.    Tiffany Osborn LPN      "

## 2023-02-17 ENCOUNTER — HOSPITAL ENCOUNTER (OUTPATIENT)
Dept: CARDIAC REHAB | Facility: OTHER | Age: 68
Discharge: HOME | End: 2023-02-17
Attending: NURSE PRACTITIONER
Payer: MEDICARE

## 2023-02-17 ENCOUNTER — LAB (OUTPATIENT)
Dept: LAB | Facility: OTHER | Age: 68
End: 2023-02-17
Attending: NURSE PRACTITIONER
Payer: MEDICARE

## 2023-02-17 DIAGNOSIS — E78.5 HYPERLIPIDEMIA LDL GOAL <70: ICD-10-CM

## 2023-02-17 DIAGNOSIS — Z95.5 STATUS POST INSERTION OF DRUG-ELUTING STENT INTO LEFT ANTERIOR DESCENDING (LAD) ARTERY: ICD-10-CM

## 2023-02-17 LAB
CHOLEST SERPL-MCNC: 152 MG/DL
HDLC SERPL-MCNC: 44 MG/DL
HOLD SPECIMEN: NORMAL
LDLC SERPL CALC-MCNC: 83 MG/DL
NONHDLC SERPL-MCNC: 108 MG/DL
TRIGL SERPL-MCNC: 127 MG/DL

## 2023-02-17 PROCEDURE — 80061 LIPID PANEL: CPT | Mod: ZL

## 2023-02-17 PROCEDURE — 93798 PHYS/QHP OP CAR RHAB W/ECG: CPT

## 2023-02-17 PROCEDURE — 36415 COLL VENOUS BLD VENIPUNCTURE: CPT | Mod: ZL

## 2023-02-20 ENCOUNTER — HOSPITAL ENCOUNTER (OUTPATIENT)
Dept: CARDIAC REHAB | Facility: OTHER | Age: 68
Discharge: HOME | End: 2023-02-20
Attending: NURSE PRACTITIONER
Payer: MEDICARE

## 2023-02-20 PROCEDURE — 93798 PHYS/QHP OP CAR RHAB W/ECG: CPT

## 2023-02-22 ENCOUNTER — HOSPITAL ENCOUNTER (OUTPATIENT)
Dept: CARDIAC REHAB | Facility: OTHER | Age: 68
Discharge: HOME | End: 2023-02-22
Attending: NURSE PRACTITIONER
Payer: MEDICARE

## 2023-02-22 PROCEDURE — 93798 PHYS/QHP OP CAR RHAB W/ECG: CPT

## 2023-02-27 ENCOUNTER — HOSPITAL ENCOUNTER (OUTPATIENT)
Dept: CARDIAC REHAB | Facility: OTHER | Age: 68
Discharge: HOME | End: 2023-02-27
Attending: NURSE PRACTITIONER
Payer: MEDICARE

## 2023-02-27 PROCEDURE — 93798 PHYS/QHP OP CAR RHAB W/ECG: CPT

## 2023-03-01 ENCOUNTER — HOSPITAL ENCOUNTER (OUTPATIENT)
Dept: CARDIAC REHAB | Facility: OTHER | Age: 68
Discharge: HOME | End: 2023-03-01
Attending: NURSE PRACTITIONER
Payer: MEDICARE

## 2023-03-01 PROCEDURE — 93798 PHYS/QHP OP CAR RHAB W/ECG: CPT

## 2023-03-03 ENCOUNTER — HOSPITAL ENCOUNTER (OUTPATIENT)
Dept: CARDIAC REHAB | Facility: OTHER | Age: 68
Discharge: HOME | End: 2023-03-03
Attending: NURSE PRACTITIONER
Payer: MEDICARE

## 2023-03-03 PROCEDURE — 93798 PHYS/QHP OP CAR RHAB W/ECG: CPT

## 2023-03-06 ENCOUNTER — HOSPITAL ENCOUNTER (OUTPATIENT)
Dept: CARDIAC REHAB | Facility: OTHER | Age: 68
Discharge: HOME | End: 2023-03-06
Attending: NURSE PRACTITIONER
Payer: MEDICARE

## 2023-03-06 PROCEDURE — 93798 PHYS/QHP OP CAR RHAB W/ECG: CPT

## 2023-03-08 ENCOUNTER — HOSPITAL ENCOUNTER (OUTPATIENT)
Dept: CARDIAC REHAB | Facility: OTHER | Age: 68
Discharge: HOME | End: 2023-03-08
Attending: STUDENT IN AN ORGANIZED HEALTH CARE EDUCATION/TRAINING PROGRAM
Payer: MEDICARE

## 2023-03-08 PROCEDURE — 93798 PHYS/QHP OP CAR RHAB W/ECG: CPT

## 2023-03-13 ENCOUNTER — HOSPITAL ENCOUNTER (OUTPATIENT)
Dept: CARDIAC REHAB | Facility: OTHER | Age: 68
Discharge: HOME | End: 2023-03-13
Attending: STUDENT IN AN ORGANIZED HEALTH CARE EDUCATION/TRAINING PROGRAM
Payer: MEDICARE

## 2023-03-13 PROCEDURE — 93798 PHYS/QHP OP CAR RHAB W/ECG: CPT

## 2023-03-15 ENCOUNTER — HOSPITAL ENCOUNTER (OUTPATIENT)
Dept: CARDIAC REHAB | Facility: OTHER | Age: 68
Discharge: HOME | End: 2023-03-15
Attending: STUDENT IN AN ORGANIZED HEALTH CARE EDUCATION/TRAINING PROGRAM
Payer: MEDICARE

## 2023-03-15 PROCEDURE — 93798 PHYS/QHP OP CAR RHAB W/ECG: CPT

## 2023-03-20 ENCOUNTER — HOSPITAL ENCOUNTER (OUTPATIENT)
Dept: CARDIAC REHAB | Facility: OTHER | Age: 68
Discharge: HOME | End: 2023-03-20
Attending: STUDENT IN AN ORGANIZED HEALTH CARE EDUCATION/TRAINING PROGRAM
Payer: MEDICARE

## 2023-03-20 PROCEDURE — 93798 PHYS/QHP OP CAR RHAB W/ECG: CPT

## 2023-03-24 ENCOUNTER — HOSPITAL ENCOUNTER (OUTPATIENT)
Dept: CARDIAC REHAB | Facility: OTHER | Age: 68
Discharge: HOME | End: 2023-03-24
Attending: STUDENT IN AN ORGANIZED HEALTH CARE EDUCATION/TRAINING PROGRAM
Payer: MEDICARE

## 2023-03-24 PROCEDURE — 93798 PHYS/QHP OP CAR RHAB W/ECG: CPT

## 2023-04-10 ENCOUNTER — TELEPHONE (OUTPATIENT)
Dept: INTERNAL MEDICINE | Facility: OTHER | Age: 68
End: 2023-04-10
Payer: COMMERCIAL

## 2023-04-10 NOTE — TELEPHONE ENCOUNTER
Spoke to Izzy and she is having lots of bruising and a small amount of bright red blood with bowel movement. Spoke to Antoinette and she would like her to come into the clinic and have labs. Izzy is able to come in tomorrow at 2:20.   Tami Viveros LPN on 4/10/2023 at 8:43 AM

## 2023-04-10 NOTE — TELEPHONE ENCOUNTER
Please call the patient.  She has questions about blood thinner medication.      Shara Julio on 4/10/2023 at 7:40 AM

## 2023-04-11 ENCOUNTER — OFFICE VISIT (OUTPATIENT)
Dept: INTERNAL MEDICINE | Facility: OTHER | Age: 68
End: 2023-04-11
Payer: COMMERCIAL

## 2023-04-11 VITALS
OXYGEN SATURATION: 95 % | DIASTOLIC BLOOD PRESSURE: 60 MMHG | WEIGHT: 174.9 LBS | SYSTOLIC BLOOD PRESSURE: 120 MMHG | TEMPERATURE: 97.5 F | RESPIRATION RATE: 16 BRPM | BODY MASS INDEX: 33.05 KG/M2 | HEART RATE: 82 BPM

## 2023-04-11 DIAGNOSIS — T14.8XXA BRUISING: Primary | ICD-10-CM

## 2023-04-11 LAB
BASOPHILS # BLD AUTO: 0.1 10E3/UL (ref 0–0.2)
BASOPHILS NFR BLD AUTO: 1 %
EOSINOPHIL # BLD AUTO: 0.2 10E3/UL (ref 0–0.7)
EOSINOPHIL NFR BLD AUTO: 2 %
ERYTHROCYTE [DISTWIDTH] IN BLOOD BY AUTOMATED COUNT: 13.2 % (ref 10–15)
HCT VFR BLD AUTO: 41.8 % (ref 35–47)
HGB BLD-MCNC: 14.3 G/DL (ref 11.7–15.7)
HOLD SPECIMEN: NORMAL
IMM GRANULOCYTES # BLD: 0 10E3/UL
IMM GRANULOCYTES NFR BLD: 0 %
LYMPHOCYTES # BLD AUTO: 3 10E3/UL (ref 0.8–5.3)
LYMPHOCYTES NFR BLD AUTO: 33 %
MCH RBC QN AUTO: 30.7 PG (ref 26.5–33)
MCHC RBC AUTO-ENTMCNC: 34.2 G/DL (ref 31.5–36.5)
MCV RBC AUTO: 90 FL (ref 78–100)
MONOCYTES # BLD AUTO: 0.8 10E3/UL (ref 0–1.3)
MONOCYTES NFR BLD AUTO: 8 %
NEUTROPHILS # BLD AUTO: 5.1 10E3/UL (ref 1.6–8.3)
NEUTROPHILS NFR BLD AUTO: 56 %
NRBC # BLD AUTO: 0 10E3/UL
NRBC BLD AUTO-RTO: 0 /100
PLATELET # BLD AUTO: 241 10E3/UL (ref 150–450)
RBC # BLD AUTO: 4.66 10E6/UL (ref 3.8–5.2)
WBC # BLD AUTO: 9.1 10E3/UL (ref 4–11)

## 2023-04-11 PROCEDURE — 36415 COLL VENOUS BLD VENIPUNCTURE: CPT | Mod: ZL

## 2023-04-11 PROCEDURE — 85014 HEMATOCRIT: CPT | Mod: ZL

## 2023-04-11 PROCEDURE — G0463 HOSPITAL OUTPT CLINIC VISIT: HCPCS

## 2023-04-11 PROCEDURE — 99213 OFFICE O/P EST LOW 20 MIN: CPT

## 2023-04-11 ASSESSMENT — ENCOUNTER SYMPTOMS
ABDOMINAL DISTENTION: 0
VOMITING: 0
BRUISES/BLEEDS EASILY: 1
WEAKNESS: 1
NAUSEA: 0
ABDOMINAL PAIN: 0

## 2023-04-11 ASSESSMENT — PAIN SCALES - GENERAL: PAINLEVEL: NO PAIN (0)

## 2023-04-11 NOTE — PROGRESS NOTES
Assessment & Plan   Izzy Lopez is a 67 year old presenting for the following health issues: Started plavix on 2/1/23.       ICD-10-CM    1. Bruising  T14.8XXA CBC and Differential     CBC and Differential        Discussed with cardiology patient's Plavix along with side effects of the medication.  Recommendation was to not discontinue or change to different medication.  Patient had CBC today which does not show any signs of thrombocytopenia.  Recommended patient to continue medication due to her cardiac stent and to reduce her risk, patient states that she is unsure whether she will want to continue taking it.  Encouraged her to follow-up with cardiology if she continues to have concerns.  Patient verbalizes understanding of the importance of continuing Plavix with her cardiac stent.    No follow-ups on file.    AKBAR Braxton [facility]      Subjective   Izzy is a 67 year old, presenting for the following health issues:  RECHECK (Left leg bruises)      Patient presents to clinic for concern of excessive bruising after starting 75 mg of Plavix by her cardiologist on 2-1-23.  She has as.  Coronary angiography on 2-1-2023 resulting in PCI of the LAD.  She was started on Plavix with strict compliance to stay on this medication for 1 year.  Baby aspirin indefinitely.  She has noticed that she started taking this medication she has multiple bruises on her arms, large bruise on the thigh.  She is concerned whether she should continue to take this medication.        History of Present Illness       Reason for visit:  Brusing    She eats 2-3 servings of fruits and vegetables daily.She consumes 0 sweetened beverage(s) daily.She exercises with enough effort to increase her heart rate 20 to 29 minutes per day.  She exercises with enough effort to increase her heart rate 6 days per week.   She is taking medications regularly.               Review of Systems   Gastrointestinal: Negative  for abdominal distention, abdominal pain, nausea and vomiting.   Neurological: Positive for weakness.   Hematological: Bruises/bleeds easily.   All other systems reviewed and are negative.           Objective    /60 (BP Location: Right arm, Patient Position: Sitting, Cuff Size: Adult Regular)   Pulse 82   Temp 97.5  F (36.4  C) (Tympanic)   Resp 16   Wt 79.3 kg (174 lb 14.4 oz)   SpO2 95%   BMI 33.05 kg/m    Body mass index is 33.05 kg/m .  Physical Exam  Vitals reviewed.   Constitutional:       General: She is not in acute distress.     Appearance: She is well-developed.   HENT:      Head: Normocephalic and atraumatic.      Nose: Nose normal.      Mouth/Throat:      Pharynx: No oropharyngeal exudate.   Eyes:      General: No scleral icterus.     Conjunctiva/sclera: Conjunctivae normal.      Pupils: Pupils are equal, round, and reactive to light.   Neck:      Thyroid: No thyromegaly.   Cardiovascular:      Rate and Rhythm: Normal rate and regular rhythm.      Heart sounds: No murmur heard.  Pulmonary:      Effort: Pulmonary effort is normal. No respiratory distress.      Breath sounds: Normal breath sounds. No wheezing.   Musculoskeletal:         General: No tenderness or deformity. Normal range of motion.      Cervical back: Normal range of motion and neck supple.   Lymphadenopathy:      Cervical: No cervical adenopathy.   Skin:     General: Skin is warm and dry.      Findings: Bruising present.      Comments: Multiple bruising scattered on arms and legs.  No signs of redness, DVT formation.   Neurological:      Mental Status: She is alert and oriented to person, place, and time.      Cranial Nerves: No cranial nerve deficit.      Coordination: Coordination normal.   Psychiatric:         Behavior: Behavior normal.         Thought Content: Thought content normal.         Judgment: Judgment normal.        Results for orders placed or performed in visit on 04/11/23   Extra Tube     Status: None     Narrative    The following orders were created for panel order Extra Tube.  Procedure                               Abnormality         Status                     ---------                               -----------         ------                     Extra Serum Separator Tu...[[id number]]                      Final result                 Please view results for these tests on the individual orders.   CBC with platelets and differential     Status: None   Result Value Ref Range    WBC Count 9.1 4.0 - 11.0 10e3/uL    RBC Count 4.66 3.80 - 5.20 10e6/uL    Hemoglobin 14.3 11.7 - 15.7 g/dL    Hematocrit 41.8 35.0 - 47.0 %    MCV 90 78 - 100 fL    MCH 30.7 26.5 - 33.0 pg    MCHC 34.2 31.5 - 36.5 g/dL    RDW 13.2 10.0 - 15.0 %    Platelet Count 241 150 - 450 10e3/uL    % Neutrophils 56 %    % Lymphocytes 33 %    % Monocytes 8 %    % Eosinophils 2 %    % Basophils 1 %    % Immature Granulocytes 0 %    NRBCs per 100 WBC 0 <1 /100    Absolute Neutrophils 5.1 1.6 - 8.3 10e3/uL    Absolute Lymphocytes 3.0 0.8 - 5.3 10e3/uL    Absolute Monocytes 0.8 0.0 - 1.3 10e3/uL    Absolute Eosinophils 0.2 0.0 - 0.7 10e3/uL    Absolute Basophils 0.1 0.0 - 0.2 10e3/uL    Absolute Immature Granulocytes 0.0 <=0.4 10e3/uL    Absolute NRBCs 0.0 10e3/uL   Extra Serum Separator Tube (SST)     Status: None   Result Value Ref Range    Hold Specimen [facility]    CBC and Differential     Status: None    Narrative    The following orders were created for panel order CBC and Differential.  Procedure                               Abnormality         Status                     ---------                               -----------         ------                     CBC with platelets and d...[[id number]]                      Final result                 Please view results for these tests on the individual orders.

## 2023-04-11 NOTE — PATIENT INSTRUCTIONS
Recommendation is to continue Plavix for 1 year along with aspirin- Will check labwork to rule out low platelets- will call with results.

## 2023-04-11 NOTE — NURSING NOTE
Chief Complaint   Patient presents with     RECHECK     Left leg bruises   Patient presents to the clinic today for bruises that she has on her left leg.    Medication Reconciliation: completed   Nevin Maxwell LPN  4/11/2023 2:30 PM

## 2023-04-27 ENCOUNTER — OFFICE VISIT (OUTPATIENT)
Dept: ORTHOPEDICS | Facility: OTHER | Age: 68
End: 2023-04-27
Attending: SPECIALIST/TECHNOLOGIST
Payer: MEDICARE

## 2023-04-27 VITALS — TEMPERATURE: 95.8 F | BODY MASS INDEX: 33.07 KG/M2 | HEART RATE: 70 BPM | RESPIRATION RATE: 16 BRPM | WEIGHT: 175 LBS

## 2023-04-27 DIAGNOSIS — L60.0 INGROWN TOENAIL: Primary | ICD-10-CM

## 2023-04-27 PROCEDURE — G0463 HOSPITAL OUTPT CLINIC VISIT: HCPCS

## 2023-04-27 PROCEDURE — 11730 AVULSION NAIL PLATE SIMPLE 1: CPT | Performed by: SPECIALIST/TECHNOLOGIST

## 2023-04-27 PROCEDURE — 250N000009 HC RX 250: Performed by: SPECIALIST/TECHNOLOGIST

## 2023-04-27 RX ORDER — LIDOCAINE HYDROCHLORIDE 10 MG/ML
10 INJECTION, SOLUTION EPIDURAL; INFILTRATION; INTRACAUDAL; PERINEURAL ONCE
Status: COMPLETED | OUTPATIENT
Start: 2023-04-27 | End: 2023-04-27

## 2023-04-27 RX ADMIN — Medication 3 SWAB.: at 09:57

## 2023-04-27 RX ADMIN — LIDOCAINE HYDROCHLORIDE 10 ML: 10 INJECTION, SOLUTION EPIDURAL; INFILTRATION; INTRACAUDAL; PERINEURAL at 09:58

## 2023-04-27 NOTE — PROGRESS NOTES
HPI: Patient with previous medical history of ingrown toe nail here today for partial removal of left great toenail. They deny specific trauma or injury. This has been an ongoing issue for many years. Symptoms include pain to touch and nail irritation. Past treatments include partial removal and keeping it cut short.     Exam:   General: Patient is alert and oriented, in no apparent distress.   Vascular: Pedal pulses palpable with adequate capillary refill.  Neuro: Light touch sensation intact distally. Negative tinel sign.  Derm: The medial aspect of great nail is digging into the surrounding soft tissue. There is no erythema, there is no drainage noted. There is no signs of cellulitis. Mild onychomycosis in all of her left toenail.   MS: Able to wiggle toes. No significant deformity noted.    Assessment: Painful ingrown toenail without paronychia of left great toe nail.     Plan: Discussed options for partial toenail removal versus complete toenail removal. Also discussed options for permanent nail removal with chemical matrixectomy versus temporary toenail removal. Reviews risks, benefits, and complications. Patient wishes to proceed with permanent partial toe nail removal.    Procedure: In addition to todays office visit, a procedure was performed. After obtaining verbal and written consent the patient's left great toe was anesthetized with 5cc of 1% lidocaine plain. Toe was prepped with Chloroprep and tourniquet was used for exsanguination and hemostasis. Utilizing sterile instrumentation, the medial nail plate was avulsed from the underlying nailbed and surrounding soft tissue.     Sterile anvil nippers were used to separate medial edges from primary toenail. Each edge was removed with sterile hemostat.    The matrix was then treated with 3 applications of 89% phenol and irrigated with isopropyl alcohol.     Tourniquet was removed and there was brisk capillary refill to the toe. The site was dressed with  antibiotic ointment, 4x4's, and secured with coban.    Post-op instructions were given verbally and as a handout. Acetaminophen for pain and dressing can be removed in 12-24 hours. They should do daily foot soaks in mild soapy water for 2-4 weeks, cover with antibiotic ointment and dry dressing.    Follow-up as needed.    Lizet Espinoza PA-C

## 2023-05-15 ENCOUNTER — HOSPITAL ENCOUNTER (OUTPATIENT)
Dept: CARDIOLOGY | Facility: OTHER | Age: 68
Discharge: HOME | End: 2023-05-15
Attending: NURSE PRACTITIONER | Admitting: NURSE PRACTITIONER
Payer: MEDICARE

## 2023-05-15 DIAGNOSIS — I50.21 ACUTE HFREF (HEART FAILURE WITH REDUCED EJECTION FRACTION) (H): ICD-10-CM

## 2023-05-15 DIAGNOSIS — R09.89 DEPRESSED LEFT VENTRICULAR EJECTION FRACTION: ICD-10-CM

## 2023-05-15 DIAGNOSIS — Z95.5 STATUS POST INSERTION OF DRUG-ELUTING STENT INTO LEFT ANTERIOR DESCENDING (LAD) ARTERY: ICD-10-CM

## 2023-05-15 DIAGNOSIS — R93.1 REGIONAL WALL MOTION ABNORMALITY OF HEART: ICD-10-CM

## 2023-05-15 LAB — LVEF ECHO: NORMAL

## 2023-05-15 PROCEDURE — 93306 TTE W/DOPPLER COMPLETE: CPT

## 2023-05-15 PROCEDURE — 93306 TTE W/DOPPLER COMPLETE: CPT | Mod: 26 | Performed by: INTERNAL MEDICINE

## 2023-05-17 ENCOUNTER — TELEPHONE (OUTPATIENT)
Dept: CARDIOLOGY | Facility: OTHER | Age: 68
End: 2023-05-17
Payer: COMMERCIAL

## 2023-05-18 NOTE — TELEPHONE ENCOUNTER
Patient called and was to get a same day appointment made with AKBAR Bennett CNP to go over results. The patient thought you wanted to see her next week.  Nothing available next week.  Wants to bring her daughter with and would need to be after 2 pm.  An appointment mistakenly made for 5/23/23 at 1:45 and patient could make that, but there already is a new patient in the slot.   Please advise.  Kalie Harvey LPN ....................5/18/2023   5:00 PM

## 2023-05-25 NOTE — TELEPHONE ENCOUNTER
Pt has an appointment with AKBAR Bennett CNP on 5/30/23 to go over the results of her echo.    Nevin Kilgore RN on 5/25/2023 at 12:21 PM

## 2023-05-30 ENCOUNTER — OFFICE VISIT (OUTPATIENT)
Dept: CARDIOLOGY | Facility: OTHER | Age: 68
End: 2023-05-30
Attending: NURSE PRACTITIONER
Payer: COMMERCIAL

## 2023-05-30 ENCOUNTER — TELEPHONE (OUTPATIENT)
Dept: CARDIOLOGY | Facility: OTHER | Age: 68
End: 2023-05-30

## 2023-05-30 VITALS
BODY MASS INDEX: 31.11 KG/M2 | WEIGHT: 164.8 LBS | HEIGHT: 61 IN | TEMPERATURE: 97.6 F | RESPIRATION RATE: 18 BRPM | OXYGEN SATURATION: 95 %

## 2023-05-30 DIAGNOSIS — R00.1 BRADYCARDIA: ICD-10-CM

## 2023-05-30 DIAGNOSIS — Z95.5 STATUS POST INSERTION OF DRUG-ELUTING STENT INTO LEFT ANTERIOR DESCENDING (LAD) ARTERY: Primary | ICD-10-CM

## 2023-05-30 DIAGNOSIS — R42 EPISODIC LIGHTHEADEDNESS: ICD-10-CM

## 2023-05-30 DIAGNOSIS — I95.1 ORTHOSTATIC HYPOTENSION: ICD-10-CM

## 2023-05-30 DIAGNOSIS — I25.5 ISCHEMIC CARDIOMYOPATHY: ICD-10-CM

## 2023-05-30 DIAGNOSIS — I34.0 MILD MITRAL REGURGITATION: ICD-10-CM

## 2023-05-30 DIAGNOSIS — E78.5 HYPERLIPIDEMIA LDL GOAL <70: ICD-10-CM

## 2023-05-30 LAB
ATRIAL RATE - MUSE: 45 BPM
DIASTOLIC BLOOD PRESSURE - MUSE: NORMAL MMHG
INTERPRETATION ECG - MUSE: NORMAL
P AXIS - MUSE: 61 DEGREES
PR INTERVAL - MUSE: 138 MS
QRS DURATION - MUSE: 86 MS
QT - MUSE: 468 MS
QTC - MUSE: 404 MS
R AXIS - MUSE: 39 DEGREES
SYSTOLIC BLOOD PRESSURE - MUSE: NORMAL MMHG
T AXIS - MUSE: 46 DEGREES
VENTRICULAR RATE- MUSE: 45 BPM

## 2023-05-30 PROCEDURE — 93005 ELECTROCARDIOGRAM TRACING: CPT | Performed by: NURSE PRACTITIONER

## 2023-05-30 PROCEDURE — 93010 ELECTROCARDIOGRAM REPORT: CPT | Performed by: INTERNAL MEDICINE

## 2023-05-30 PROCEDURE — G0463 HOSPITAL OUTPT CLINIC VISIT: HCPCS | Performed by: NURSE PRACTITIONER

## 2023-05-30 PROCEDURE — 99214 OFFICE O/P EST MOD 30 MIN: CPT | Performed by: NURSE PRACTITIONER

## 2023-05-30 ASSESSMENT — PAIN SCALES - GENERAL: PAINLEVEL: NO PAIN (0)

## 2023-05-30 NOTE — TELEPHONE ENCOUNTER
Patient was just in- switched her to Brilinta due to bruising from Plavix- Her copay is $600 for the Brilinta. Can she try taking 1/2 does of plavix? Or needs something else affordable.Please call today

## 2023-05-30 NOTE — TELEPHONE ENCOUNTER
Please let patient know that Plavix would be the only affordable, limited options for antiplatelet medication following coronary stenting. She can not take half dose of Plavix as this would not be effective. I would recommend she stay on Plavix 75 mg daily with Aspirin 81 mg daily at this time. The soonest I would let her go off Plavix would be 6 months after her stent placed however, it is safest to stay on Plavix with Aspirin for the full year after.  Thanks,   AKBAR Schofield CNP

## 2023-05-30 NOTE — NURSING NOTE
"Chief Complaint   Patient presents with     Follow Up     Regarding Echo         Medication reconciliation completed.    Chief Complaint   Patient presents with     Follow Up     Regarding Echo         Initial /64 (BP Location: Right arm, Patient Position: Sitting, Cuff Size: Adult Regular)   Pulse 61   Temp 97.6  F (36.4  C) (Temporal)   Resp 18   Ht 1.549 m (5' 1\")   Wt 74.8 kg (164 lb 12.8 oz)   SpO2 95%   BMI 31.14 kg/m   Estimated body mass index is 31.14 kg/m  as calculated from the following:    Height as of this encounter: 1.549 m (5' 1\").    Weight as of this encounter: 74.8 kg (164 lb 12.8 oz).  Meds Reconciled: complete  Pt is on Aspirin  Pt is on a Statin  PHQ and/or MAGALY reviewed. Pt referred to PCP/MH Provider as appropriate.    Dago Ceron, THEODORE      "

## 2023-05-30 NOTE — PROGRESS NOTES
[facility] HEART CARE   CARDIOLOGY PROGRESS NOTE    Izzy Lopez   [address] [address] [address]    No primary care provider on file.     Chief Complaint   Patient presents with     Follow Up     Regarding Echo          HPI:   Mrs. Lopez is a 67 year old female who presents for cardiology follow-up to visit on 2/15/2023.  S/p coronary angiography on 2/1/2023 resulting in PCI of the LAD.  Patient was initially evaluated by cardiology on 1/18/2023 endorsing chest pain and increased exertion dyspnea. She underwent TTE revealing mildly depressed EF with RWMA concerning for ischemic CM.     Patient has a history notable for long standing HLD- recently started on Atorvastatin, family history of premature CAD on her dads side of the family, chronic back pain with sciatica, obesity and history of diverticulosis. She does have a history of remote tobacco use for which she quit several years ago. Patient has never been diagnosed with HTN or DM.     S/p coronary angiography at [facility] on 2/1/2023 by Dr. Pang.  She was identified to have severe single-vessel CAD, focal 90% stenosis of the pLAD treated with single LINDA (3.0 x 12 mm Synergy).  Discharged on DAPT of aspirin 81 mg daily and Plavix 75 mg daily for optimally 1 year, she will remain on baby ASA life long following LIDNA.  Continue on high intensity statin and started on low-dose beta-blocker for cardiac protection.    INTERVAL HISTORY:  Today, patient admits that she continues to feel good following stent placement.  No increased dyspnea, energy level significantly improved, no chest pain or pressure. No palpitations. She wa recently noted to have symptomatic bradycardia during her echocardiogram, her heart rate remained in the 40s bpm throughout the study and she describes symptoms of lightheadedness with this.  She has continued to have symptoms of lightheadedness associated to lower heart rate when working around the house, she is also  noted symptoms of orthostatic lightheadedness.  Additionally, patient is concerned in regards to excessive bruising on Plavix and ASA, reviewed maintaining dual antiplatelet therapy for 1 year s/p PCI with LINDA. She would like to try alternative to Plavix. Brilinta has been ordered.       RELEVANT TESTING REVIEWED:  Echocardiogram 5/15/2023  Interpretation Summary  Patient has bradycardia and feels dizzy .  Left ventricular wall thickness is normal. Mild left ventricular dilation is  present. The visual ejection fraction is 45-50%. Mild diffuse hypokinesis is  present.  Right ventricular function, chamber size, wall motion, and thickness are  normal.  Trace to mild mitral insufficiency is present. Mild tricuspid insufficiency is  present. The aorta root is normal.  The inferior vena cava was normal in size with preserved respiratory  variability. No pericardial effusion is present.  Compared to prior study, LV function appeas to be the same but inferior WMA is  not readily appreciated on this study.    Echocardiogram 2023  Interpretation Summary  Left ventricular function is mildly reduced. The visually estimated ejection  fraction is 45-50%. Mild diffuse hypokinesis which is slightly more pronounced  in the inferolateral segments. Consider ischemic cardiomyopathy.  Global right ventricular function is normal.  No hemodynamically significant valve abnormalities.  The inferior vena cava is normal.  Normal diameter aortic root and proximal ascending aorta. Large atheroma noted  at the sinotubular junction.  There is no prior study for direct comparison.    PAST MEDICAL HISTORY:   Past Medical History:   Diagnosis Date     Hyperlipidemia     No Comments Provided          FAMILY HISTORY:   Family History   Problem Relation Age of Onset     Cancer Mother 60        Cancer,lung     Other - See Comments Father 60        AAA, during repair     Other - See Comments Paternal Grandmother         GI Disease     Other -  See Comments Paternal Grandfather         GI Disease     Breast Cancer Sister         Cancer-breast          PAST SURGICAL HISTORY:   Past Surgical History:   Procedure Laterality Date     COLONOSCOPY  2015    hyperplastic, follow up, 24     HYSTERECTOMY TOTAL ABDOMINAL, BILATERAL SALPINGO-OOPHORECTOMY, COMBINED      No Comments Provided     LAPAROSCOPIC TUBAL LIGATION      No Comments Provided     TONSILLECTOMY      No Comments Provided          SOCIAL HISTORY:   Social History     Socioeconomic History     Marital status:      Spouse name: None     Number of children: None     Years of education: None     Highest education level: None   Tobacco Use     Smoking status: Former     Packs/day: 0.50     Years: 5.00     Pack years: 2.50     Types: Cigarettes     Quit date: 1980     Years since quittin.0     Smokeless tobacco: Former   Vaping Use     Vaping Use: Never used   Substance and Sexual Activity     Alcohol use: No     Alcohol/week: 0.0 standard drinks     Drug use: No     Comment: Drug use: Not Asked     Sexual activity: Yes     Partners: Male   Social History Narrative    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          CURRENT MEDICATIONS:   Current Outpatient Medications   Medication     aspirin (ASA) 81 MG EC tablet     atorvastatin (LIPITOR) 40 MG tablet     clopidogrel (PLAVIX) 75 MG tablet     magnesium hydroxide (MILK OF MAGNESIA) 400 MG/5ML suspension     metoprolol succinate ER (TOPROL XL) 25 MG 24 hr tablet     polyethylene glycol (MIRALAX) 17 GM/Dose powder     psyllium (METAMUCIL/KONSYL) capsule     senna (SENOKOT) 8.6 MG tablet     No current facility-administered medications for this visit.       ALLERGIES:   No Known Allergies       ROS:   CONSTITUTIONAL: No reported fever or chills. No changes in weight.  ENT: No visual disturbance, ear ache, epistaxis or sore throat.   CARDIOVASCULAR: Chest pains resolved following PCI. No palpitations,  "no edema.   RESPIRATORY: BAY resolved. No cough, wheezing or hemoptysis. No orthopnea or PND.   GI: No reported abdominal pain, melena or hematochezia. No changes in bowel habits.   : No reported hematuria or dysuria.   NEUROLOGICAL: Positive for occasional episodes of ightheadedness, see HPI. No dizziness, syncope, ataxia, paresthesias or weakness.   HEMATOLOGIC: No history of anemia. No bleeding or excessive bruising. No history of blood clots.   MUSCULOSKELETAL: No new joint pain or swelling, no muscle pain. Positive for chronic back pain.   ENDOCRINOLOGIC: No temperature intolerance. No hair or skin changes.  SKIN: No abnormal rashes or sores, no unusual itching.      PHYSICAL EXAM:   Temp 97.6  F (36.4  C) (Temporal)   Resp 18   Ht 1.549 m (5' 1\")   Wt 74.8 kg (164 lb 12.8 oz)   SpO2 95%   BMI 31.14 kg/m    GENERAL: The patient is a well-developed, well-nourished, in no apparent distress.  HEENT: Head is normocephalic and atraumatic. Eyes are symmetrical with normal visual tracking. No icterus, no xanthelasmas. Nares appeared normal without nasal drainage. Mucous membranes are moist, no cyanosis.  NECK: Supple, no cervical bruits, JVP not visible.   CHEST/ LUNGS: Lungs clear to auscultation, no rales, rhonchi or wheezes, no use of accessory muscles, no retractions, respirations unlabored and normal respiratory rate.   CARDIO: Regular rate and rhythm normal with S1 and S2, no S3 or S4 and no murmur, click or rub.   ABD: Abdomen is nondistended.  EXTREMITIES: No edema present.  MUSCULOSKELETAL: No joint swelling.   NEUROLOGIC: Alert and oriented X3. Normal speech, gait and affect. No focal neurologic deficits.   SKIN: No jaundice. No rashes or visible skin lesions present. No ecchymosis.     LAB RESULTS:   Hospital Outpatient Visit on 05/15/2023   Component Date Value Ref Range Status     LVEF  05/15/2023 45-50%   Final       ASSESSMENT:   Izzy Lopez presents for cardiology follow-up to visit on " 2/15/2023.  S/p coronary angiography on 2/1/2023 resulting in PCI of the LAD.  Patient was initially evaluated by cardiology on 1/18/2023 endorsing chest pain and increased exertion dyspnea. She underwent TTE revealing mildly depressed EF with RWMA concerning for ischemic CM.   Today, patient admits that she continues to feel good following stent placement.  No increased dyspnea, energy level significantly improved, no chest pain or pressure. No palpitations. She wa recently noted to have symptomatic bradycardia during her echocardiogram, her heart rate remained in the 40s bpm throughout the study and she describes symptoms of lightheadedness with this.  She has continued to have symptoms of lightheadedness associated to lower heart rate when working around the house, she is also noted symptoms of orthostatic lightheadedness.  Additionally, patient is concerned in regards to excessive bruising on Plavix and ASA, reviewed maintaining dual antiplatelet therapy for 1 year s/p PCI with LINDA. She would like to try alternative to Plavix. Brilinta has been ordered.     1. Status post insertion of drug-eluting stent into left anterior descending (LAD) artery- 2/1/2023 ([facility])  2. Ischemic cardiomyopathy  3. Hyperlipidemia LDL goal <70  4. Bradycardia  5. Episodic lightheadedness  6. Mild mitral regurgitation  7. Orthostatic hypotension      PLAN:   1. S/p coronary angiography at [facility] on 2/1/2023 by Dr. Pang.  She was identified to have severe single-vessel CAD, focal 90% stenosis of the pLAD treated with single LINDA (3.0 x 12 mm Synergy).    2. Continue DAPT for optimally 1 year (2/2024).  Excessive bruising on ASA and Plavix.  Patient is requesting alternative to Plavix.  She will remain on ASA 81 mg daily indefinitely.  She will stop Plavix at this time and replace with Brilinta 90 mg twice daily to be taken until 2/1/2024. Continue on high intensity statin with goal LDL <70.   3. Positive for symptomatic  bradycardia and orthostatic hypotension. Orthostatic exam in clinic was positive today: Laying- 109/57 with rate 51 bpm, stand- 86/58, HR 65 bpm, stand 1min- 90/58, HR 63 bpm, patient was symptomatic with standing.   4. Due to bradycardia and orthostatic hypotension, patient will discontinue Toprol XL.  5.  Reviewed echocardiogram from 5/15/2023 revealing continued mildly depressed LVEF at 45-50%, mild LV dilation, Inferior wall motion abnormality resolved following PCI.   6. Ischemic CM, LVEF 45-50%, NYHA FC II, AHA/ACC stage C, no recent hospitalizations. Unable to tolerate GDMT due to significant symptomatic hypotension, see above. She has not required diuretic.   7. ECG revealing sinus bradycardia today, rate 45 bpm.  8.  If patient continues to have symptomatic bradycardia after going off the beta-blocker, I would like to see her proceed with 2 weeks ZIO cardiac monitor for further evaluation before considering possible need for pacing.     Orders Placed This Encounter   Procedures     EKG 12-lead, tracing only (Same Day)       Thank you for allowing me to participate in the care of your patient. Please do not hesitate to contact me if you have any questions.     Rebecca Porter, APRN CNP [facility]

## 2023-05-30 NOTE — PATIENT INSTRUCTIONS
Stop metoprolol completely at this time due to slow heart rate and low blood pressure.  Please let me know if the symptoms are not improved after going off metoprolol, we may need to consider 2-week cardiac monitor if persisting symptoms.  Follow-up with cardiology in 3 months, certainly sooner if needed.  Stop Plavix and replace with new medication Brilinta due to bruising.  I would like you to take Brilinta 90 mg twice daily until February 1, 2024.  You will take this along with your daily baby aspirin (81 mg).

## 2023-05-31 ENCOUNTER — TELEPHONE (OUTPATIENT)
Dept: CARDIOLOGY | Facility: OTHER | Age: 68
End: 2023-05-31
Payer: COMMERCIAL

## 2023-05-31 DIAGNOSIS — Z95.5 STATUS POST INSERTION OF DRUG-ELUTING STENT INTO LEFT ANTERIOR DESCENDING (LAD) ARTERY: Primary | ICD-10-CM

## 2023-05-31 NOTE — TELEPHONE ENCOUNTER
Patient returned call. Spoke to patient after verifying last name and date of birth. Pt was given the below response. Pt said that she was informed about the program that is offered at [facility] pharmacy and was going to come in today and apply for the program. Patient states that if it is still to expensive she will need a new prescription sent in for the Plavix. Pt will call and let us know if she needs a new prescription.     Val Warren RN on 5/31/2023 at 8:27 AM

## 2023-06-01 RX ORDER — CLOPIDOGREL BISULFATE 75 MG/1
75 TABLET ORAL
Qty: 30 TABLET | Refills: 1 | Status: SHIPPED | OUTPATIENT
Start: 2023-06-01 | End: 2023-09-18

## 2023-08-03 DIAGNOSIS — Z95.5 STATUS POST INSERTION OF DRUG-ELUTING STENT INTO LEFT ANTERIOR DESCENDING (LAD) ARTERY: ICD-10-CM

## 2023-08-03 NOTE — TELEPHONE ENCOUNTER
Fax received from [employer] [facility] for a refill request of Brilinta.  AKBAR Bennett CNP filled out fax.  Will route refill request to AKBAR Bennett CNP for her to sign and print.  Mago Ragland RN......August 3, 2023...11:23 AM

## 2023-08-07 ENCOUNTER — TELEPHONE (OUTPATIENT)
Dept: CARDIOLOGY | Facility: OTHER | Age: 68
End: 2023-08-07
Payer: COMMERCIAL

## 2023-08-07 NOTE — TELEPHONE ENCOUNTER
Fax sent by [employer] prescription savings program stating they are unable to process refill of Brilinta tablets due to missing information. Form placed in providers basket for completion.     Maci Bhatti LPN on 8/7/2023 at 8:36 AM

## 2023-08-08 NOTE — TELEPHONE ENCOUNTER
Form has been signed by AKBAR Bennett CNP. Information has been faxed back to [phone]. Placed to HIM for scanning.     Maci Bhatti LPN on 8/8/2023 at 9:35 AM

## 2023-09-18 ENCOUNTER — OFFICE VISIT (OUTPATIENT)
Dept: CARDIOLOGY | Facility: OTHER | Age: 68
End: 2023-09-18
Attending: NURSE PRACTITIONER
Payer: COMMERCIAL

## 2023-09-18 VITALS
SYSTOLIC BLOOD PRESSURE: 126 MMHG | WEIGHT: 154.6 LBS | OXYGEN SATURATION: 99 % | BODY MASS INDEX: 29.19 KG/M2 | TEMPERATURE: 97 F | HEIGHT: 61 IN | DIASTOLIC BLOOD PRESSURE: 80 MMHG | HEART RATE: 64 BPM | RESPIRATION RATE: 14 BRPM

## 2023-09-18 DIAGNOSIS — R09.89 DEPRESSED LEFT VENTRICULAR EJECTION FRACTION: ICD-10-CM

## 2023-09-18 DIAGNOSIS — Z95.5 STATUS POST INSERTION OF DRUG-ELUTING STENT INTO LEFT ANTERIOR DESCENDING (LAD) ARTERY: Primary | ICD-10-CM

## 2023-09-18 DIAGNOSIS — I34.0 MILD MITRAL REGURGITATION: ICD-10-CM

## 2023-09-18 DIAGNOSIS — E78.5 HYPERLIPIDEMIA LDL GOAL <70: ICD-10-CM

## 2023-09-18 PROCEDURE — G0463 HOSPITAL OUTPT CLINIC VISIT: HCPCS

## 2023-09-18 PROCEDURE — 99214 OFFICE O/P EST MOD 30 MIN: CPT | Performed by: NURSE PRACTITIONER

## 2023-09-18 ASSESSMENT — PAIN SCALES - GENERAL: PAINLEVEL: NO PAIN (0)

## 2023-09-18 NOTE — PATIENT INSTRUCTIONS
Stop Brilinta in February 2024. Resume Aspirin 81 mg daily long term.   Repeat Echocardiogram in May 2024.

## 2023-09-18 NOTE — PROGRESS NOTES
[facility] HEART CARE   CARDIOLOGY PROGRESS NOTE    Izzy Lopez   [address] [address] [id number]    No primary care provider on file.     Chief Complaint   Patient presents with    Follow Up     4 month Cardiology         HPI:   Mrs. Lopez is a 68 year old female who presents for cardiology follow-up to visit on 5/30/2023.  S/p coronary angiography on 2/1/2023 resulting in PCI of the LAD.  Patient was initially evaluated by cardiology on 1/18/2023 endorsing chest pain and increased exertion dyspnea. She underwent TTE revealing mildly depressed LVEF with RWMA concerning for ischemic CM.     Patient has a history notable for long standing HLD- recently started on Atorvastatin, family history of premature CAD on her dads side of the family, chronic back pain with sciatica, obesity and history of diverticulosis. She does have a history of remote tobacco use for which she quit several years ago. Patient has never been diagnosed with HTN or DM.     S/p coronary angiography at [facility] on 2/1/2023 by Dr. Pang.  She was identified to have severe single-vessel CAD, focal 90% stenosis of the pLAD treated with single LINDA (3.0 x 12 mm Synergy).  Discharged on DAPT of aspirin 81 mg daily and Plavix 75 mg daily for optimally 1 year, she will remain on baby ASA life long following LINDA.  Continue on high intensity statin and started on low-dose beta-blocker for cardiac protection.    INTERVAL HISTORY:  Today, patient admits that she has been feeling very good. No chest pain or pressure. Recent atypical sharp chest pain mid sternal without radiation. No increased dyspnea. No palpitations, positive for occasional lightheadedness at times. Improved from symptoms when on beta blocker. No further bradycardia since going off beta blocker. No edema.  No other concerns today.    RELEVANT TESTING REVIEWED:  Echocardiogram 5/15/2023  Interpretation Summary  Patient has bradycardia and feels dizzy .  Left  ventricular wall thickness is normal. Mild left ventricular dilation is  present. The visual ejection fraction is 45-50%. Mild diffuse hypokinesis is  present.  Right ventricular function, chamber size, wall motion, and thickness are  normal.  Trace to mild mitral insufficiency is present. Mild tricuspid insufficiency is  present. The aorta root is normal.  The inferior vena cava was normal in size with preserved respiratory  variability. No pericardial effusion is present.  Compared to prior study, LV function appeas to be the same but inferior WMA is  not readily appreciated on this study.    Echocardiogram 2023  Interpretation Summary  Left ventricular function is mildly reduced. The visually estimated ejection  fraction is 45-50%. Mild diffuse hypokinesis which is slightly more pronounced  in the inferolateral segments. Consider ischemic cardiomyopathy.  Global right ventricular function is normal.  No hemodynamically significant valve abnormalities.  The inferior vena cava is normal.  Normal diameter aortic root and proximal ascending aorta. Large atheroma noted  at the sinotubular junction.  There is no prior study for direct comparison.    PAST MEDICAL HISTORY:   Past Medical History:   Diagnosis Date    Hyperlipidemia     No Comments Provided          FAMILY HISTORY:   Family History   Problem Relation Age of Onset    Cancer Mother 60        Cancer,lung    Other - See Comments Father 60        AAA, during repair    Other - See Comments Paternal Grandmother         GI Disease    Other - See Comments Paternal Grandfather         GI Disease    Breast Cancer Sister         Cancer-breast          PAST SURGICAL HISTORY:   Past Surgical History:   Procedure Laterality Date    COLONOSCOPY  2015    hyperplastic, follow up, 24    HYSTERECTOMY TOTAL ABDOMINAL, BILATERAL SALPINGO-OOPHORECTOMY, COMBINED      No Comments Provided    LAPAROSCOPIC TUBAL LIGATION      No Comments Provided     TONSILLECTOMY      No Comments Provided          SOCIAL HISTORY:   Social History     Socioeconomic History    Marital status:      Spouse name: None    Number of children: None    Years of education: None    Highest education level: None   Tobacco Use    Smoking status: Former     Packs/day: 0.50     Years: 5.00     Pack years: 2.50     Types: Cigarettes     Quit date: 1980     Years since quittin.0    Smokeless tobacco: Former   Vaping Use    Vaping Use: Never used   Substance and Sexual Activity    Alcohol use: No     Alcohol/week: 0.0 standard drinks    Drug use: No     Comment: Drug use: Not Asked    Sexual activity: Yes     Partners: Male   Social History Narrative    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          CURRENT MEDICATIONS:   Current Outpatient Medications   Medication    aspirin (ASA) 81 MG EC tablet    ticagrelor (BRILINTA) 90 MG tablet    atorvastatin (LIPITOR) 40 MG tablet     No current facility-administered medications for this visit.       ALLERGIES:   No Known Allergies     ROS:   CONSTITUTIONAL: No reported fever or chills. No changes in weight.  ENT: No visual disturbance, ear ache, epistaxis or sore throat.   CARDIOVASCULAR: Chest pains resolved following PCI. No palpitations, no edema.   RESPIRATORY: BAY resolved. No cough, wheezing or hemoptysis. No orthopnea or PND.   GI: No reported abdominal pain, melena or hematochezia. No changes in bowel habits.   : No reported hematuria or dysuria.   NEUROLOGICAL: Positive for occasional episodes of ightheadedness, see HPI. No dizziness, syncope, ataxia, paresthesias or weakness.   HEMATOLOGIC: No history of anemia. No bleeding or excessive bruising. No history of blood clots.   MUSCULOSKELETAL: No new joint pain or swelling, no muscle pain. Positive for chronic back pain.   ENDOCRINOLOGIC: No temperature intolerance. No hair or skin changes.  SKIN: No abnormal rashes or sores, no unusual  "itching.      PHYSICAL EXAM:   /80 (BP Location: Right arm, Patient Position: Sitting, Cuff Size: Adult Regular)   Pulse 64   Temp 97  F (36.1  C) (Tympanic)   Resp 14   Ht 1.549 m (5' 1\")   Wt 70.1 kg (154 lb 9.6 oz)   SpO2 99%   BMI 29.21 kg/m    GENERAL: The patient is a well-developed, well-nourished, in no apparent distress.  HEENT: Head is normocephalic and atraumatic. Eyes are symmetrical with normal visual tracking. No icterus, no xanthelasmas. Nares appeared normal without nasal drainage. Mucous membranes are moist, no cyanosis.  NECK: Supple, no cervical bruits, JVP not visible.   CHEST/ LUNGS: Lungs clear to auscultation, no rales, rhonchi or wheezes, no use of accessory muscles, no retractions, respirations unlabored and normal respiratory rate.   CARDIO: Regular rate and rhythm normal with S1 and S2, no S3 or S4 and no murmur, click or rub.   ABD: Abdomen is nondistended.  EXTREMITIES: No edema present.  MUSCULOSKELETAL: No joint swelling.   NEUROLOGIC: Alert and oriented X3. Normal speech, gait and affect. No focal neurologic deficits.   SKIN: No jaundice. No rashes or visible skin lesions present. No ecchymosis.     LAB RESULTS:   Office Visit on 05/30/2023   Component Date Value Ref Range Status    Ventricular Rate 05/30/2023 45  BPM Final    Atrial Rate 05/30/2023 45  BPM Final    MS Interval 05/30/2023 138  ms Final    QRS Duration 05/30/2023 86  ms Final    QT 05/30/2023 468  ms Final    QTc 05/30/2023 404  ms Final    P Axis 05/30/2023 61  degrees Final    R AXIS 05/30/2023 39  degrees Final    T Axis 05/30/2023 46  degrees Final    Interpretation ECG 05/30/2023    Final                    Value:Sinus bradycardia  Otherwise normal ECG  When compared with ECG of 18-JAN-2023 15:26,  No significant change was found  Confirmed by MD SCHULTE DANIEL ([id number]) on 5/30/2023 5:14:15 PM         ASSESSMENT:   Izzy LINARES Jessica presents for cardiology follow-up to visit on 5/30/2023.  S/p coronary " angiography on 2/1/2023 resulting in PCI of the LAD.  Patient was initially evaluated by cardiology on 1/18/2023 endorsing chest pain and increased exertion dyspnea. She underwent TTE revealing mildly depressed LVEF with RWMA concerning for ischemic CM.     1. Status post insertion of drug-eluting stent into left anterior descending (LAD) artery- 2/1/2023 ([facility])  2. Hyperlipidemia LDL goal <70  3. Mild mitral regurgitation  4. Depressed left ventricular ejection fraction      PLAN:   1. S/p coronary angiography at [facility] on 2/1/2023 by Dr. Pang.  She was identified to have severe single-vessel CAD, focal 90% stenosis of the pLAD treated with single LINDA (3.0 x 12 mm Synergy).    2. Continue DAPT for optimally 1 year (2/2024).  She will remain on ASA 81 mg daily indefinitely.  Continue Brilinta 90 mg twice daily to be taken until 2/1/2024. Continue on high intensity statin with goal LDL <70.   3. Bradycardia and symptomatic hypotension resolved after going off beta blocker.  4. She has not been taking statin, strongly encouraged following PCI for LDL reduction and plaque stabilization.  5.  Previously reviewed echocardiogram from 5/15/2023 revealing continued mildly depressed LVEF at 45-50%, mild LV dilation, Inferior wall motion abnormality resolved following PCI. Repeat TTE imaging in May 2024.  6. Follow-up with cardiology in June 2024, sooner if needed.    Orders Placed This Encounter   Procedures    Echocardiogram Complete       Thank you for allowing me to participate in the care of your patient. Please do not hesitate to contact me if you have any questions.     Rebecca Porter, APRN CNP [facility]

## 2023-09-18 NOTE — NURSING NOTE
"Chief Complaint   Patient presents with    Follow Up     4 month Cardiology        Initial /80 (BP Location: Right arm, Patient Position: Sitting, Cuff Size: Adult Regular)   Pulse 64   Temp 97  F (36.1  C) (Tympanic)   Resp 14   Ht 1.549 m (5' 1\")   Wt 70.1 kg (154 lb 9.6 oz)   SpO2 99%   BMI 29.21 kg/m   Estimated body mass index is 29.21 kg/m  as calculated from the following:    Height as of this encounter: 1.549 m (5' 1\").    Weight as of this encounter: 70.1 kg (154 lb 9.6 oz).  Meds Reconciled: complete  Pt is on Aspirin  Pt is not on a Statin  Pt is not on Xarelto or Eliquis  Pt is not on a Warfarin   PHQ and/or MAGALY reviewed. Pt referred to PCP/MH Provider as appropriate.    Maci Bhatti LPN      FOOD SECURITY SCREENING QUESTIONS:    The next two questions are to help us understand your food security.  If you are feeling you need any assistance in this area, we have resources available to support you today.    Hunger Vital Signs:  Within the past 12 months we worried whether our food would run out before we got money to buy more. Never  Within the past 12 months the food we bought just didn't last and we didn't have money to get more. Never  Maci Bhatti LPN,LPN on 9/18/2023 at 8:48 AM      "

## 2023-10-03 ENCOUNTER — DOCUMENTATION ONLY (OUTPATIENT)
Dept: CARDIOLOGY | Facility: OTHER | Age: 68
End: 2023-10-03
Payer: COMMERCIAL

## 2023-10-03 NOTE — CONFIDENTIAL NOTE
Received a Shipment notification That Brilinta was shipped to patient on 9/28/23 via [employer]. Melania Michele RN on 10/3/2023 at 8:59 AM

## 2023-10-23 ENCOUNTER — OFFICE VISIT (OUTPATIENT)
Dept: FAMILY MEDICINE | Facility: OTHER | Age: 68
End: 2023-10-23
Attending: FAMILY MEDICINE
Payer: COMMERCIAL

## 2023-10-23 VITALS
HEART RATE: 63 BPM | RESPIRATION RATE: 16 BRPM | DIASTOLIC BLOOD PRESSURE: 66 MMHG | OXYGEN SATURATION: 96 % | WEIGHT: 159.2 LBS | BODY MASS INDEX: 30.08 KG/M2 | SYSTOLIC BLOOD PRESSURE: 128 MMHG | TEMPERATURE: 97.4 F
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
## 2023-10-23 PROCEDURE — G0463 HOSPITAL OUTPT CLINIC VISIT: HCPCS

## 2023-10-23 PROCEDURE — 99213 OFFICE O/P EST LOW 20 MIN: CPT | Performed by: FAMILY MEDICINE

## 2023-10-23 RX ORDER — DOXYCYCLINE 100 MG/1
200 CAPSULE ORAL ONCE
Qty: 2 CAPSULE | Refills: 0 | Status: SHIPPED | OUTPATIENT
Start: 2023-10-23 | End: 2023-10-23

## 2023-10-23 NOTE — NURSING NOTE
"Chief Complaint   Patient presents with    Insect Bites     Tick bite         Initial /66 (BP Location: Right arm, Patient Position: Sitting, Cuff Size: Adult Regular)   Pulse 63   Temp 97.4  F (36.3  C) (Tympanic)   Resp 16   Wt 72.2 kg (159 lb 3.2 oz)   SpO2 96%   BMI 30.08 kg/m   Estimated body mass index is 30.08 kg/m  as calculated from the following:    Height as of 9/18/23: 1.549 m (5' 1\").    Weight as of this encounter: 72.2 kg (159 lb 3.2 oz).  Medication Reconciliation: complete    Roslyn Spangler LPN    Advance Care Directive reviewed    "

## 2023-10-23 NOTE — PROGRESS NOTES
"  Assessment & Plan       ICD-10-CM    1. Tick bite, unspecified site, initial encounter  W57.XXXA doxycycline hyclate (VIBRAMYCIN) 100 MG capsule        Current exam findings consistent with local reaction to tick bite.  No evidence of systemic disease.  Reviewed options.  Did recommend a one-time doxycycline dose of 200 mg.  Discussed signs and symptoms of Lyme disease, handout provided.  Patient should follow-up if any of these develop.     BMI:   Estimated body mass index is 30.08 kg/m  as calculated from the following:    Height as of 9/18/23: 1.549 m (5' 1\").    Weight as of this encounter: 72.2 kg (159 lb 3.2 oz).         No follow-ups on file.    Gwen Brooks MD  [facility]    Subjective   Izzy is a 68 year old, presenting for the following health issues:  Insect Bites (Tick bite/)        10/23/2023     2:50 PM   Additional Questions   Roomed by Roslyn   Accompanied by self       History of Present Illness       Reason for visit:  Tick bite    She eats 2-3 servings of fruits and vegetables daily.She consumes 0 sweetened beverage(s) daily.She exercises with enough effort to increase her heart rate 20 to 29 minutes per day.  She exercises with enough effort to increase her heart rate 4 days per week.   She is taking medications regularly.     Tick removed during sleep about 3 days ago. Not sure what kind. Not sure how long attached. Rubbing the area has caused some bruising (on ticagrelor).         Objective    /66 (BP Location: Right arm, Patient Position: Sitting, Cuff Size: Adult Regular)   Pulse 63   Temp 97.4  F (36.3  C) (Tympanic)   Resp 16   Wt 72.2 kg (159 lb 3.2 oz)   SpO2 96%   BMI 30.08 kg/m    Body mass index is 30.08 kg/m .  Physical Exam  Constitutional:       Appearance: She is well-developed.   HENT:      Right Ear: External ear normal.      Left Ear: External ear normal.   Eyes:      General: No scleral icterus.     Conjunctiva/sclera: Conjunctivae normal. "   Cardiovascular:      Rate and Rhythm: Normal rate.   Pulmonary:      Effort: Pulmonary effort is normal. No respiratory distress.   Skin:     Findings: No rash.      Comments: Insect bite L flank with 1 cm surrounding erythema, some bruising around this.    Neurological:      Mental Status: She is alert.

## 2023-11-22 DIAGNOSIS — Z95.5 STATUS POST INSERTION OF DRUG-ELUTING STENT INTO LEFT ANTERIOR DESCENDING (LAD) ARTERY: ICD-10-CM

## 2023-11-22 NOTE — TELEPHONE ENCOUNTER
Information has been processed and faxed back to [employer]. Place to HIM to be scanned.     Maci Bhatti LPN on 11/22/2023 at 3:37 PM

## 2023-11-22 NOTE — TELEPHONE ENCOUNTER
Patient is enrolled in [employer] assistance medication affordable program. [facility] is needing providers fax number and a printed prescription faxed back to them in order to process the order. Medication is pended below for printing. Please advise.     Maci Bhatti LPN on 11/22/2023 at 11:04 AM    Pending Prescriptions:                       Disp   Refills    ticagrelor (BRILINTA) 90 MG tablet        180 ta*4            Sig: Take 90 mg twice daily until 2/1/2024.

## 2023-12-07 ENCOUNTER — TELEPHONE (OUTPATIENT)
Dept: CARDIOLOGY | Facility: OTHER | Age: 68
End: 2023-12-07
Payer: COMMERCIAL

## 2023-12-07 NOTE — TELEPHONE ENCOUNTER
Patient is requesting to know if she should stop taking plavix at the end of January. And then start taking baby aspirin at that time, she thought CRISSY Porter told her to do this but is not sure. She also is questioning is there are supposed to be any orders in for an ultrasound to be done and when , she wants to also confirm her appointment in June 2024 is adequate for her needs . She  is not having any problems at this time. Her call back # is [phone]    Lucy Norwood on 12/7/2023 at 9:37 AM

## 2023-12-07 NOTE — TELEPHONE ENCOUNTER
Per clinic visit on 9/18/2023 from AKBAR Bennett CNP:       Return in about 37 weeks (around 6/3/2024).  Stop Brilinta in February 2024. Resume Aspirin 81 mg daily long term.   Repeat Echocardiogram in May 2024.        Patient has been contacted and made aware of after visit summary. Voiced understanding.     Maci Bhatti LPN on 12/7/2023 at 12:53 PM

## 2024-01-24 ENCOUNTER — OFFICE VISIT (OUTPATIENT)
Dept: INTERNAL MEDICINE | Facility: OTHER | Age: 69
End: 2024-01-24
Payer: MEDICARE

## 2024-01-24 ENCOUNTER — HOSPITAL ENCOUNTER (OUTPATIENT)
Dept: GENERAL RADIOLOGY | Facility: OTHER | Age: 69
Discharge: HOME | End: 2024-01-24
Payer: MEDICARE

## 2024-01-24 VITALS
HEART RATE: 68 BPM | TEMPERATURE: 97 F | WEIGHT: 161 LBS | OXYGEN SATURATION: 100 % | HEIGHT: 60 IN | DIASTOLIC BLOOD PRESSURE: 80 MMHG | RESPIRATION RATE: 16 BRPM | BODY MASS INDEX: 31.61 KG/M2 | SYSTOLIC BLOOD PRESSURE: 122 MMHG

## 2024-01-24 DIAGNOSIS — I70.0 ATHEROSCLEROSIS OF AORTA (H): ICD-10-CM

## 2024-01-24 DIAGNOSIS — E66.9 OBESITY (BMI 30-39.9): ICD-10-CM

## 2024-01-24 DIAGNOSIS — E78.5 HYPERLIPIDEMIA LDL GOAL <70: ICD-10-CM

## 2024-01-24 DIAGNOSIS — M79.644 PAIN OF RIGHT THUMB: ICD-10-CM

## 2024-01-24 DIAGNOSIS — N18.31 STAGE 3A CHRONIC KIDNEY DISEASE (H): ICD-10-CM

## 2024-01-24 DIAGNOSIS — I50.21 ACUTE HFREF (HEART FAILURE WITH REDUCED EJECTION FRACTION) (H): ICD-10-CM

## 2024-01-24 DIAGNOSIS — Z95.5 STATUS POST INSERTION OF DRUG-ELUTING STENT INTO LEFT ANTERIOR DESCENDING (LAD) ARTERY: ICD-10-CM

## 2024-01-24 DIAGNOSIS — Z00.00 ENCOUNTER FOR MEDICARE ANNUAL WELLNESS EXAM: Primary | ICD-10-CM

## 2024-01-24 DIAGNOSIS — Z78.0 POSTMENOPAUSAL STATUS: ICD-10-CM

## 2024-01-24 DIAGNOSIS — M18.11 PRIMARY OSTEOARTHRITIS OF FIRST CARPOMETACARPAL JOINT OF RIGHT HAND: ICD-10-CM

## 2024-01-24 LAB
ALBUMIN SERPL BCG-MCNC: 4.3 G/DL (ref 3.5–5.2)
ALP SERPL-CCNC: 106 U/L (ref 40–150)
ALT SERPL W P-5'-P-CCNC: 16 U/L (ref 0–50)
ANION GAP SERPL CALCULATED.3IONS-SCNC: 9 MMOL/L (ref 7–15)
AST SERPL W P-5'-P-CCNC: 23 U/L (ref 0–45)
BASOPHILS # BLD AUTO: 0.1 10E3/UL (ref 0–0.2)
BASOPHILS NFR BLD AUTO: 1 %
BILIRUB SERPL-MCNC: 0.3 MG/DL
BUN SERPL-MCNC: 27.7 MG/DL (ref 8–23)
CALCIUM SERPL-MCNC: 9.5 MG/DL (ref 8.8–10.2)
CHLORIDE SERPL-SCNC: 105 MMOL/L (ref 98–107)
CHOLEST SERPL-MCNC: 240 MG/DL
CREAT SERPL-MCNC: 0.97 MG/DL (ref 0.51–0.95)
DEPRECATED HCO3 PLAS-SCNC: 24 MMOL/L (ref 22–29)
EGFRCR SERPLBLD CKD-EPI 2021: 63 ML/MIN/1.73M2
EOSINOPHIL # BLD AUTO: 0.2 10E3/UL (ref 0–0.7)
EOSINOPHIL NFR BLD AUTO: 2 %
ERYTHROCYTE [DISTWIDTH] IN BLOOD BY AUTOMATED COUNT: 13.2 % (ref 10–15)
FASTING STATUS PATIENT QL REPORTED: NO
GLUCOSE SERPL-MCNC: 84 MG/DL (ref 70–99)
HCT VFR BLD AUTO: 43.3 % (ref 35–47)
HCV AB SERPL QL IA: NONREACTIVE
HDLC SERPL-MCNC: 54 MG/DL
HGB BLD-MCNC: 14.7 G/DL (ref 11.7–15.7)
IMM GRANULOCYTES # BLD: 0 10E3/UL
IMM GRANULOCYTES NFR BLD: 0 %
LDLC SERPL CALC-MCNC: 168 MG/DL
LYMPHOCYTES # BLD AUTO: 3.2 10E3/UL (ref 0.8–5.3)
LYMPHOCYTES NFR BLD AUTO: 34 %
MCH RBC QN AUTO: 31 PG (ref 26.5–33)
MCHC RBC AUTO-ENTMCNC: 33.9 G/DL (ref 31.5–36.5)
MCV RBC AUTO: 91 FL (ref 78–100)
MONOCYTES # BLD AUTO: 0.8 10E3/UL (ref 0–1.3)
MONOCYTES NFR BLD AUTO: 8 %
NEUTROPHILS # BLD AUTO: 5.3 10E3/UL (ref 1.6–8.3)
NEUTROPHILS NFR BLD AUTO: 55 %
NONHDLC SERPL-MCNC: 186 MG/DL
NRBC # BLD AUTO: 0 10E3/UL
NRBC BLD AUTO-RTO: 0 /100
PLATELET # BLD AUTO: 290 10E3/UL (ref 150–450)
POTASSIUM SERPL-SCNC: 4.5 MMOL/L (ref 3.4–5.3)
PROT SERPL-MCNC: 7.4 G/DL (ref 6.4–8.3)
RBC # BLD AUTO: 4.74 10E6/UL (ref 3.8–5.2)
SODIUM SERPL-SCNC: 138 MMOL/L (ref 135–145)
TRIGL SERPL-MCNC: 90 MG/DL
WBC # BLD AUTO: 9.5 10E3/UL (ref 4–11)

## 2024-01-24 PROCEDURE — 86803 HEPATITIS C AB TEST: CPT | Mod: ZL

## 2024-01-24 PROCEDURE — 85025 COMPLETE CBC W/AUTO DIFF WBC: CPT | Mod: ZL

## 2024-01-24 PROCEDURE — 73130 X-RAY EXAM OF HAND: CPT | Mod: RT

## 2024-01-24 PROCEDURE — G0463 HOSPITAL OUTPT CLINIC VISIT: HCPCS | Mod: 25

## 2024-01-24 PROCEDURE — G0402 INITIAL PREVENTIVE EXAM: HCPCS

## 2024-01-24 PROCEDURE — 80061 LIPID PANEL: CPT | Mod: ZL

## 2024-01-24 PROCEDURE — 73110 X-RAY EXAM OF WRIST: CPT | Mod: RT

## 2024-01-24 PROCEDURE — G0463 HOSPITAL OUTPT CLINIC VISIT: HCPCS

## 2024-01-24 PROCEDURE — 80053 COMPREHEN METABOLIC PANEL: CPT | Mod: ZL

## 2024-01-24 PROCEDURE — 36415 COLL VENOUS BLD VENIPUNCTURE: CPT | Mod: ZL

## 2024-01-24 PROCEDURE — 99214 OFFICE O/P EST MOD 30 MIN: CPT | Mod: 25

## 2024-01-24 PROCEDURE — G0402 INITIAL PREVENTIVE EXAM: HCPCS | Mod: 25

## 2024-01-24 ASSESSMENT — ACTIVITIES OF DAILY LIVING (ADL): CURRENT_FUNCTION: NO ASSISTANCE NEEDED

## 2024-01-24 ASSESSMENT — ENCOUNTER SYMPTOMS
DIARRHEA: 0
ARTHRALGIAS: 1
PALPITATIONS: 0
COUGH: 0
LIGHT-HEADEDNESS: 1
VOMITING: 0
ABDOMINAL PAIN: 0
SHORTNESS OF BREATH: 0
CHILLS: 0
FATIGUE: 0
FEVER: 0

## 2024-01-24 ASSESSMENT — PAIN SCALES - GENERAL: PAINLEVEL: SEVERE PAIN (7)

## 2024-01-24 NOTE — NURSING NOTE
"Chief Complaint   Patient presents with    RECHECK     Right wrist pain   Patient presents to the clinic today for right wrist pain. Patient states that she has been having pain in her right wrist for the last three months. She states that her left hand is now feeling pain. She states that when she uses it it falls asleep.    Initial There were no vitals taken for this visit. Estimated body mass index is 30.08 kg/m  as calculated from the following:    Height as of 9/18/23: 1.549 m (5' 1\").    Weight as of 10/23/23: 72.2 kg (159 lb 3.2 oz).  Meds Reconciled: complete      Nevin Maxwell LPN,THEODORE on 1/24/2024 at 9:32 AM  Ext. [phone]        Nevin Maxwell LPN  "

## 2024-01-24 NOTE — LETTER
January 24, 2024      Izzy Lopez  [id number] [address] [id number]        Dear ,    We are writing to inform you of your test results.      X-rays show moderate to severe arthritis in your thumb. I recommend that you follow-up with Dr. Jay to discuss further treatment, consider steroid injection.  Overall, your lab work looks normal.  I do recommend you consider taking atorvastatin to lower your cholesterol levels. If you do not want to take, please start taking fish oil/omega-3.  Continue plan to follow-up with echocardiogram in May, follow-up with cardiology in June.      Results for orders placed or performed during the hospital encounter of 01/24/24   XR Wrist Right G/E 3 Views     Status: None    Narrative    PROCEDURE:  XR WRIST RIGHT G/E 3 VIEWS    HISTORY: Pain of right thumb    COMPARISON: No relevant priors available for comparison    TECHNIQUE:  XR WRIST RIGHT 4 VIEWS    FINDINGS:   No acute fracture or dislocation is identified. Specifically, on  carpal tunnel view, the hook of the hamate, pisiform and trapezium  demonstrate no fractures. No suspicious osseous lesion. Moderate to  advanced degenerative changes about the first CMC and triscaphe  joints.    No foreign body or subcutaneous emphysema.        Impression    IMPRESSION:   Degenerative changes without acute osseous abnormality.    GUTIERREZ PHIPPS MD         SYSTEM ID:  [id number]   Results for orders placed or performed during the hospital encounter of 01/24/24   XR Hand Right G/E 3 Views     Status: None    Narrative    PROCEDURE:  XR HAND RIGHT G/E 3 VIEWS    HISTORY: Pain of right thumb    COMPARISON: No relevant priors available for comparison    TECHNIQUE:  XR HAND RIGHT 3 VIEWS    FINDINGS:   No acute fracture or dislocation is identified. No suspicious osseous  lesion. Moderate to advanced degenerative changes about the first CMC  and triscaphe joints. Moderate degenerative changes of the  first  interphalangeal, second distal interphalangeal, and third proximal  interphalangeal joints.    No foreign body or subcutaneous emphysema.        Impression    IMPRESSION:   Degenerative changes without acute osseous abnormality.    GUTIERREZ PHIPPS MD         SYSTEM ID:  [id number]   Results for orders placed or performed in visit on 01/24/24   Comprehensive Metabolic Panel     Status: Abnormal   Result Value Ref Range    Sodium 138 135 - 145 mmol/L    Potassium 4.5 3.4 - 5.3 mmol/L    Carbon Dioxide (CO2) 24 22 - 29 mmol/L    Anion Gap 9 7 - 15 mmol/L    Urea Nitrogen 27.7 (H) 8.0 - 23.0 mg/dL    Creatinine 0.97 (H) 0.51 - 0.95 mg/dL    GFR Estimate 63 >60 mL/min/1.73m2    Calcium 9.5 8.8 - 10.2 mg/dL    Chloride 105 98 - 107 mmol/L    Glucose 84 70 - 99 mg/dL    Alkaline Phosphatase 106 40 - 150 U/L    AST 23 0 - 45 U/L    ALT 16 0 - 50 U/L    Protein Total 7.4 6.4 - 8.3 g/dL    Albumin 4.3 3.5 - 5.2 g/dL    Bilirubin Total 0.3 <=1.2 mg/dL   Lipid Panel     Status: Abnormal   Result Value Ref Range    Cholesterol 240 (H) <200 mg/dL    Triglycerides 90 <150 mg/dL    Direct Measure HDL 54 >=50 mg/dL    LDL Cholesterol Calculated 168 (H) <=100 mg/dL    Non HDL Cholesterol 186 (H) <130 mg/dL    Patient Fasting > 8hrs? No     Narrative    Cholesterol  Desirable:  <200 mg/dL    Triglycerides  Normal:  Less than 150 mg/dL  Borderline High:  150-199 mg/dL  High:  200-499 mg/dL  Very High:  Greater than or equal to 500 mg/dL    Direct Measure HDL  Female:  Greater than or equal to 50 mg/dL   Male:  Greater than or equal to 40 mg/dL    LDL Cholesterol  Desirable:  <100mg/dL  Above Desirable:  100-129 mg/dL   Borderline High:  130-159 mg/dL   High:  160-189 mg/dL   Very High:  >= 190 mg/dL    Non HDL Cholesterol  Desirable:  130 mg/dL  Above Desirable:  130-159 mg/dL  Borderline High:  160-189 mg/dL  High:  190-219 mg/dL  Very High:  Greater than or equal to 220 mg/dL   CBC with platelets and differential     Status: None    Result Value Ref Range    WBC Count 9.5 4.0 - 11.0 10e3/uL    RBC Count 4.74 3.80 - 5.20 10e6/uL    Hemoglobin 14.7 11.7 - 15.7 g/dL    Hematocrit 43.3 35.0 - 47.0 %    MCV 91 78 - 100 fL    MCH 31.0 26.5 - 33.0 pg    MCHC 33.9 31.5 - 36.5 g/dL    RDW 13.2 10.0 - 15.0 %    Platelet Count 290 150 - 450 10e3/uL    % Neutrophils 55 %    % Lymphocytes 34 %    % Monocytes 8 %    % Eosinophils 2 %    % Basophils 1 %    % Immature Granulocytes 0 %    NRBCs per 100 WBC 0 <1 /100    Absolute Neutrophils 5.3 1.6 - 8.3 10e3/uL    Absolute Lymphocytes 3.2 0.8 - 5.3 10e3/uL    Absolute Monocytes 0.8 0.0 - 1.3 10e3/uL    Absolute Eosinophils 0.2 0.0 - 0.7 10e3/uL    Absolute Basophils 0.1 0.0 - 0.2 10e3/uL    Absolute Immature Granulocytes 0.0 <=0.4 10e3/uL    Absolute NRBCs 0.0 10e3/uL   CBC and Differential     Status: None    Narrative    The following orders were created for panel order CBC and Differential.  Procedure                               Abnormality         Status                     ---------                               -----------         ------                     CBC with platelets and d...[[id number]]                      Final result                 Please view results for these tests on the individual orders.       No results found from the In Basket message.    If you have any questions or concerns, please call the clinic at the number listed above.       Sincerely,    Antoinette Acharya, AKBAR CNP on 1/24/2024 at 3:33 PM

## 2024-01-24 NOTE — PROGRESS NOTES
Assessment & Plan   Izzy Lopez is a 68 year old presenting for the following health issues:      ICD-10-CM    1. Encounter for Medicare annual wellness exam  Z00.00 Comprehensive Metabolic Panel     Lipid Panel     CBC and Differential     Hepatitis C Screen Reflex to HCV RNA Quant and Genotype     Comprehensive Metabolic Panel     Lipid Panel     CBC and Differential     Hepatitis C Screen Reflex to HCV RNA Quant and Genotype      2. Primary osteoarthritis of first carpometacarpal joint of right hand  M18.11 XR Hand Right G/E 3 Views     XR Wrist Right G/E 3 Views     Orthopedic  Referral      3. Postmenopausal status  Z78.0 DX Hip/Pelvis/Spine      4. Status post insertion of drug-eluting stent into left anterior descending (LAD) artery- 2/1/2023  Z95.5       5. Obesity (BMI 30-39.9)  E66.9       6. Acute HFrEF (heart failure with reduced ejection fraction) (H)  I50.21       7. Stage 3a chronic kidney disease (H)  N18.31       8. Atherosclerosis of aorta (H24)  I70.0       9. Hyperlipidemia LDL goal <70  E78.5           MIXED HYPERLIPIDEMIA.  Ongoing. LDL is at goal: No. Triglycerides are at goal: Yes. Hopefully lifestyle modifications will improve cholesterol levels.  Patient reports that she does not want to take her statin, she has not been taking this medication due to concern of potential negative health consequences.  Encouraged that she takes this medication especially with her cardiac history. If she is unwilling to take a statin- I recommend she start fish oil/omega-3.    Status post drug-eluting stent into LAD 2/1/2023: Patient denies any chest pain, palpitations or shortness of breath. She is continuing on aspirin and Brilinta.  She will be done taking her Brilinta on 2/1/2024.  History of depressed left ventricular ejection fraction, mild mitral regurgitation she is due to follow-up with an echocardiogram in June 2024. She has a follow-up appoint with cardiology in June 2024.     X-ray  of hand shows moderate to advanced degenerative changes about the first CMC and triscaphe joints. Suspect that she is suffering from arthritis flare up.  Encouraged Tylenol, continue with wrist brace.  I did refer her to orthopedics to discuss possibility of steroid injection.    Patient declines vaccinations due to beliefs.     Patient is due for dexa scan- this has been ordered.     Due for mammogram 2025    Hep C is pending    Patient is up to date on dental and vision checks.     Return in about 53 weeks (around 1/29/2025) for Annual Wellness Visit.    AKBAR Braxton [facility]      Mary Magana is a 68 year old, presenting for the following health issues:  RECHECK (Right wrist pain)    Patient presents to clinic for annual wellness visit, and to discuss worsening right thumb pain.  She has had thumb pain over the last 3 months, denies any injury. She has noticed slight swelling to the area as well, difficulty with range of motion.  She does have slight numbness with performing the Phalen's test, negative negative Tinel sign.  She has been wearing a brace which has not been extremely helpful.  She denies multiple joint pain.  She does also note that she had a possible ganglion cyst on her left wrist, she hit the area hard on a camper hitch in the pain and cyst did go away.      History of Present Illness       Reason for visit:  Wrist pain  Symptom onset:  More than a month  Symptoms include:  Wrist pain  Symptom intensity:  Severe  Symptom progression:  Worsening  Had these symptoms before:  No  What makes it worse:  Useing it    She eats 2-3 servings of fruits and vegetables daily.She consumes 0 sweetened beverage(s) daily.She exercises with enough effort to increase her heart rate 20 to 29 minutes per day.  She exercises with enough effort to increase her heart rate 3 or less days per week.   She is taking medications regularly.  She is not taking prescribed medications  "regularly due to Not applicable.  Healthy Habits:     In general, how would you rate your overall health?  Very good    Frequency of exercise:  2-3 days/week    Duration of exercise:  45-60 minutes    Do you usually eat at least 4 servings of fruit and vegetables a day, include whole grains    & fiber and avoid regularly eating high fat or \"junk\" foods?  Yes    Taking medications regularly:  0    Barriers to taking medications:  Not applicable    Medication side effects:  Not applicable    Ability to successfully perform activities of daily living:  No assistance needed    Home Safety:  No safety concerns identified    Hearing Impairment:  No hearing concerns    In the past 6 months, have you been bothered by leaking of urine?  No    In general, how would you rate your overall mental or emotional health?  Good    Additional concerns today:  Yes   Review of Systems   Constitutional:  Negative for chills, fatigue and fever.   Eyes:  Positive for visual disturbance (may need cataract surgery).   Respiratory:  Negative for cough and shortness of breath.    Cardiovascular:  Negative for chest pain and palpitations.   Gastrointestinal:  Negative for abdominal pain, diarrhea and vomiting.   Musculoskeletal:  Positive for arthralgias (right thumb).   Neurological:  Positive for light-headedness (occassionally in the morning).   All other systems reviewed and are negative.                     Objective    /80 (BP Location: Right arm, Patient Position: Sitting, Cuff Size: Adult Regular)   Pulse 68   Temp 97  F (36.1  C) (Temporal)   Resp 16   Ht 1.53 m (5' 0.25\")   Wt 73 kg (161 lb)   SpO2 100%   BMI 31.18 kg/m    Body mass index is 31.18 kg/m .  Physical Exam  Vitals reviewed.   Constitutional:       General: She is not in acute distress.     Appearance: She is well-developed.   HENT:      Head: Normocephalic and atraumatic.      Nose: Nose normal.      Mouth/Throat:      Pharynx: No oropharyngeal exudate. "   Eyes:      General: No scleral icterus.     Conjunctiva/sclera: Conjunctivae normal.      Pupils: Pupils are equal, round, and reactive to light.   Neck:      Thyroid: No thyromegaly.   Cardiovascular:      Rate and Rhythm: Normal rate and regular rhythm.      Heart sounds: No murmur heard.  Pulmonary:      Effort: Pulmonary effort is normal. No respiratory distress.      Breath sounds: Normal breath sounds. No wheezing.   Musculoskeletal:         General: No deformity.      Right hand: Swelling (right thumb, slight) and tenderness present. Decreased range of motion. Decreased strength.      Cervical back: Normal range of motion and neck supple.      Comments: Positive Phalen's test, negative Tinel test   Skin:     General: Skin is warm and dry.      Findings: No rash.   Neurological:      Mental Status: She is alert and oriented to person, place, and time.      Cranial Nerves: No cranial nerve deficit.      Coordination: Coordination normal.   Psychiatric:         Behavior: Behavior normal.         Thought Content: Thought content normal.         Judgment: Judgment normal.          Results for orders placed or performed during the hospital encounter of 01/24/24   XR Wrist Right G/E 3 Views     Status: None    Narrative    PROCEDURE:  XR WRIST RIGHT G/E 3 VIEWS    HISTORY: Pain of right thumb    COMPARISON: No relevant priors available for comparison    TECHNIQUE:  XR WRIST RIGHT 4 VIEWS    FINDINGS:   No acute fracture or dislocation is identified. Specifically, on  carpal tunnel view, the hook of the hamate, pisiform and trapezium  demonstrate no fractures. No suspicious osseous lesion. Moderate to  advanced degenerative changes about the first CMC and triscaphe  joints.    No foreign body or subcutaneous emphysema.        Impression    IMPRESSION:   Degenerative changes without acute osseous abnormality.    GUTIERREZ PHIPPS MD         SYSTEM ID:  [id number]   Results for orders placed or performed during the hospital  encounter of 01/24/24   XR Hand Right G/E 3 Views     Status: None    Narrative    PROCEDURE:  XR HAND RIGHT G/E 3 VIEWS    HISTORY: Pain of right thumb    COMPARISON: No relevant priors available for comparison    TECHNIQUE:  XR HAND RIGHT 3 VIEWS    FINDINGS:   No acute fracture or dislocation is identified. No suspicious osseous  lesion. Moderate to advanced degenerative changes about the first CMC  and triscaphe joints. Moderate degenerative changes of the first  interphalangeal, second distal interphalangeal, and third proximal  interphalangeal joints.    No foreign body or subcutaneous emphysema.        Impression    IMPRESSION:   Degenerative changes without acute osseous abnormality.    GUTIERREZ PHIPPS MD         SYSTEM ID:  [id number]   Results for orders placed or performed in visit on 01/24/24   Comprehensive Metabolic Panel     Status: Abnormal   Result Value Ref Range    Sodium 138 135 - 145 mmol/L    Potassium 4.5 3.4 - 5.3 mmol/L    Carbon Dioxide (CO2) 24 22 - 29 mmol/L    Anion Gap 9 7 - 15 mmol/L    Urea Nitrogen 27.7 (H) 8.0 - 23.0 mg/dL    Creatinine 0.97 (H) 0.51 - 0.95 mg/dL    GFR Estimate 63 >60 mL/min/1.73m2    Calcium 9.5 8.8 - 10.2 mg/dL    Chloride 105 98 - 107 mmol/L    Glucose 84 70 - 99 mg/dL    Alkaline Phosphatase 106 40 - 150 U/L    AST 23 0 - 45 U/L    ALT 16 0 - 50 U/L    Protein Total 7.4 6.4 - 8.3 g/dL    Albumin 4.3 3.5 - 5.2 g/dL    Bilirubin Total 0.3 <=1.2 mg/dL   Lipid Panel     Status: Abnormal   Result Value Ref Range    Cholesterol 240 (H) <200 mg/dL    Triglycerides 90 <150 mg/dL    Direct Measure HDL 54 >=50 mg/dL    LDL Cholesterol Calculated 168 (H) <=100 mg/dL    Non HDL Cholesterol 186 (H) <130 mg/dL    Patient Fasting > 8hrs? No     Narrative    Cholesterol  Desirable:  <200 mg/dL    Triglycerides  Normal:  Less than 150 mg/dL  Borderline High:  150-199 mg/dL  High:  200-499 mg/dL  Very High:  Greater than or equal to 500 mg/dL    Direct Measure HDL  Female:  Greater  than or equal to 50 mg/dL   Male:  Greater than or equal to 40 mg/dL    LDL Cholesterol  Desirable:  <100mg/dL  Above Desirable:  100-129 mg/dL   Borderline High:  130-159 mg/dL   High:  160-189 mg/dL   Very High:  >= 190 mg/dL    Non HDL Cholesterol  Desirable:  130 mg/dL  Above Desirable:  130-159 mg/dL  Borderline High:  160-189 mg/dL  High:  190-219 mg/dL  Very High:  Greater than or equal to 220 mg/dL   CBC with platelets and differential     Status: None   Result Value Ref Range    WBC Count 9.5 4.0 - 11.0 10e3/uL    RBC Count 4.74 3.80 - 5.20 10e6/uL    Hemoglobin 14.7 11.7 - 15.7 g/dL    Hematocrit 43.3 35.0 - 47.0 %    MCV 91 78 - 100 fL    MCH 31.0 26.5 - 33.0 pg    MCHC 33.9 31.5 - 36.5 g/dL    RDW 13.2 10.0 - 15.0 %    Platelet Count 290 150 - 450 10e3/uL    % Neutrophils 55 %    % Lymphocytes 34 %    % Monocytes 8 %    % Eosinophils 2 %    % Basophils 1 %    % Immature Granulocytes 0 %    NRBCs per 100 WBC 0 <1 /100    Absolute Neutrophils 5.3 1.6 - 8.3 10e3/uL    Absolute Lymphocytes 3.2 0.8 - 5.3 10e3/uL    Absolute Monocytes 0.8 0.0 - 1.3 10e3/uL    Absolute Eosinophils 0.2 0.0 - 0.7 10e3/uL    Absolute Basophils 0.1 0.0 - 0.2 10e3/uL    Absolute Immature Granulocytes 0.0 <=0.4 10e3/uL    Absolute NRBCs 0.0 10e3/uL   CBC and Differential     Status: None    Narrative    The following orders were created for panel order CBC and Differential.  Procedure                               Abnormality         Status                     ---------                               -----------         ------                     CBC with platelets and d...[[id number]]                      Final result                 Please view results for these tests on the individual orders.                   Signed Electronically by: AKBAR Braxton CNP

## 2024-03-19 ENCOUNTER — HOSPITAL ENCOUNTER (OUTPATIENT)
Dept: BONE DENSITY | Facility: OTHER | Age: 69
Discharge: HOME | End: 2024-03-19
Payer: MEDICARE

## 2024-03-19 DIAGNOSIS — Z78.0 POSTMENOPAUSAL STATUS: ICD-10-CM

## 2024-03-19 PROCEDURE — 77080 DXA BONE DENSITY AXIAL: CPT

## 2024-04-11 ENCOUNTER — OFFICE VISIT (OUTPATIENT)
Dept: ORTHOPEDICS | Facility: OTHER | Age: 69
End: 2024-04-11
Payer: MEDICARE

## 2024-04-11 VITALS
HEIGHT: 60 IN | SYSTOLIC BLOOD PRESSURE: 116 MMHG | HEART RATE: 62 BPM | WEIGHT: 161 LBS | RESPIRATION RATE: 16 BRPM | BODY MASS INDEX: 31.61 KG/M2 | OXYGEN SATURATION: 95 % | DIASTOLIC BLOOD PRESSURE: 76 MMHG

## 2024-04-11 DIAGNOSIS — M18.11 PRIMARY OSTEOARTHRITIS OF FIRST CARPOMETACARPAL JOINT OF RIGHT HAND: Primary | ICD-10-CM

## 2024-04-11 PROCEDURE — 20600 DRAIN/INJ JOINT/BURSA W/O US: CPT | Mod: RT | Performed by: SPECIALIST

## 2024-04-11 PROCEDURE — 99203 OFFICE O/P NEW LOW 30 MIN: CPT | Mod: 25 | Performed by: SPECIALIST

## 2024-04-11 PROCEDURE — G0463 HOSPITAL OUTPT CLINIC VISIT: HCPCS | Mod: 25

## 2024-04-11 PROCEDURE — 250N000011 HC RX IP 250 OP 636: Performed by: SPECIALIST

## 2024-04-11 RX ORDER — METHYLPREDNISOLONE ACETATE 40 MG/ML
40 INJECTION, SUSPENSION INTRA-ARTICULAR; INTRALESIONAL; INTRAMUSCULAR; SOFT TISSUE ONCE
Status: COMPLETED | OUTPATIENT
Start: 2024-04-11 | End: 2024-04-11

## 2024-04-11 RX ORDER — BUPIVACAINE HYDROCHLORIDE 5 MG/ML
1 INJECTION, SOLUTION EPIDURAL; INTRACAUDAL ONCE
Status: COMPLETED | OUTPATIENT
Start: 2024-04-11 | End: 2024-04-11

## 2024-04-11 RX ADMIN — BUPIVACAINE HYDROCHLORIDE 5 MG: 5 INJECTION, SOLUTION EPIDURAL; INTRACAUDAL; PERINEURAL at 13:41

## 2024-04-11 RX ADMIN — METHYLPREDNISOLONE ACETATE 40 MG: 40 INJECTION, SUSPENSION INTRA-ARTICULAR; INTRALESIONAL; INTRAMUSCULAR; INTRASYNOVIAL; SOFT TISSUE at 13:41

## 2024-04-11 NOTE — PROGRESS NOTES
Surgical Clinic Consult  Primary physician:     Antoinette Acharya      History of present illness:  This is a 68 year old right hand dominant female I am seeing in consultation for right thumb pain.  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  She describes right thumb discomfort over the basilar joint worse with gripping grasping activities.    Past medical history:   Past Medical History:   Diagnosis Date    Hyperlipidemia     No Comments Provided       Pastsurgical history:  Past Surgical History:   Procedure Laterality Date    COLONOSCOPY  2015    hyperplastic, follow up, 24    HYSTERECTOMY TOTAL ABDOMINAL, BILATERAL SALPINGO-OOPHORECTOMY, COMBINED      No Comments Provided    LAPAROSCOPIC TUBAL LIGATION      No Comments Provided    TONSILLECTOMY      No Comments Provided       Current medications:  Current Outpatient Medications   Medication Sig Dispense Refill    aspirin (ASA) 81 MG EC tablet Take 81 mg by mouth daily as needed for other      atorvastatin (LIPITOR) 40 MG tablet Take 1 tablet (40 mg) by mouth daily (Patient not taking: Reported on 2023) 90 tablet 3    ticagrelor (BRILINTA) 90 MG tablet Take 90 mg twice daily until 2024. 180 tablet 1       Allergies:  No Known Allergies    Family history:  Family History   Problem Relation Age of Onset    Cancer Mother 60        Cancer,lung    Other - See Comments Father 60        AAA, during repair    Other - See Comments Paternal Grandmother         GI Disease    Other - See Comments Paternal Grandfather         GI Disease    Breast Cancer Sister         Cancer-breast       Social history:  Social History     Socioeconomic History    Marital status:      Spouse name: Not on file    Number of children: Not on file    Years of education: Not on file    Highest education level: Not on file   Occupational History    Not on file   Tobacco Use     Smoking status: Former     Current packs/day: 0.00     Average packs/day: 0.5 packs/day for 5.0 years (2.5 ttl pk-yrs)     Types: Cigarettes     Start date: 1975     Quit date: 1980     Years since quittin.3    Smokeless tobacco: Former   Vaping Use    Vaping status: Never Used   Substance and Sexual Activity    Alcohol use: No     Alcohol/week: 0.0 standard drinks of alcohol    Drug use: No     Comment: Drug use: Not Asked    Sexual activity: Yes     Partners: Male   Other Topics Concern    Parent/sibling w/ CABG, MI or angioplasty before 65F 55M? Not Asked   Social History Narrative    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     Social Determinants of Health     Financial Resource Strain: Low Risk  (2024)    Financial Resource Strain     Within the past 12 months, have you or your family members you live with been unable to get utilities (heat, electricity) when it was really needed?: No   Food Insecurity: Low Risk  (2024)    Food Insecurity     Within the past 12 months, did you worry that your food would run out before you got money to buy more?: No     Within the past 12 months, did the food you bought just not last and you didn t have money to get more?: No   Transportation Needs: Low Risk  (2024)    Transportation Needs     Within the past 12 months, has lack of transportation kept you from medical appointments, getting your medicines, non-medical meetings or appointments, work, or from getting things that you need?: No   Physical Activity: Not on file   Stress: Not on file   Social Connections: Not on file   Interpersonal Safety: Low Risk  (10/23/2023)    Interpersonal Safety     Do you feel physically and emotionally safe where you currently live?: Yes     Within the past 12 months, have you been hit, slapped, kicked or otherwise physically hurt by someone?: No     Within the past 12 months, have you been humiliated or emotionally abused in other ways  by your partner or ex-partner?: No   Housing Stability: Low Risk  (1/24/2024)    Housing Stability     Do you have housing? : Yes     Are you worried about losing your housing?: No       PROBLEM LIST:  Patient Active Problem List   Diagnosis    Diverticulosis of sigmoid colon    Health care maintenance    Obesity (BMI 30-39.9)    Intrinsic atopic dermatitis    Actinic keratosis    Benign neoplasm of skin of trunk, except scrotum    Ingrown toenail    Chronic bilateral low back pain with sciatica    Family history of ischemic heart disease    Hyperlipidemia LDL goal <70    Atherosclerosis of aorta (H24)    Depressed left ventricular ejection fraction    Status post insertion of drug-eluting stent into left anterior descending (LAD) artery- 2/1/2023    Acute HFrEF (heart failure with reduced ejection fraction) (H)    Stage 3a chronic kidney disease (H)       Review of Systems:  COMPLETE 12 point REVIEW OF SYSTEMS is otherwise negative with the exception of which is stated above.    Physical exam: /76 (BP Location: Right arm, Patient Position: Sitting, Cuff Size: Adult Regular)   Pulse 62   Resp 16   Ht 1.524 m (5')   Wt 73 kg (161 lb)   SpO2 95%   BMI 31.44 kg/m      General: this is a pleasant female patient in no acute distress.  Patient is awake alert and oriented x3 .  Well-groomed, well kempt.  EXAM:  Musculoskeletal: Full symmetric range of motion of the elbows wrists examination of the right thumb reveals basal joint tenderness with a positive grind maneuver palmar abduction is mildly limited she has a positive shoulder sign    Imaging: X-rays of the right wrist and thumb dated 1/24/2024 reveal basilar joint arthrosis    Assessment:   Right thumb basilar joint arthrosis    Plan:    Proceed with steroid injection  Procedure note: The right thumb was prepped with alcohol.  The right thumb basilar joint was injected with 1 cc of Depo Medrol 40 mg/cc and 1 cc of 0.25% Marcaine without epinephrine.  A  Band-Aid was applied.  There were no complications.         Nicolas Jay MD

## 2024-04-22 ENCOUNTER — HOSPITAL ENCOUNTER (OUTPATIENT)
Dept: MAMMOGRAPHY | Facility: OTHER | Age: 69
Discharge: HOME | End: 2024-04-22
Payer: MEDICARE

## 2024-04-22 DIAGNOSIS — Z12.31 VISIT FOR SCREENING MAMMOGRAM: ICD-10-CM

## 2024-04-22 PROCEDURE — 77063 BREAST TOMOSYNTHESIS BI: CPT

## 2024-04-25 NOTE — TELEPHONE ENCOUNTER
"Patient brought in a paper that reads the following:    \"Rebecca ETIENNE,   I found help to pay for Brilinta ([facility] [phone]) Will you please go to this web and fill out, page 3. [employer], forms and resources download. Fax to [phone] any questions I think you can call (David) # Above   Thanks so much!   Izzy\"     Patient will also need 1 month refill of Plavix until this web request is processed. Refill can be called into [facility].     Writer went to the above website and printed off the needed paperwork. Writer will fill out paperwork and have AKBAR Bennett CNP sign paperwork.    Val Warren RN on 5/31/2023 at 4:07 PM       "
Called and left message for patent to call back. Calling to confirm that patient only needs page 3 filled out.     Val Warren RN on 6/1/2023 at 11:31 AM    
Page 3 that patient requested be filled out and faxed was faxed to [phone].  Confirmation that fax was sent successfully was received.     Val Warren RN on 6/1/2023 at 1:51 PM     
Plavix order signed.   AKBAR Shcofield CNP    
Received a fax back looking clarification on the medication directions from [employer]. Directions updated and faxed back.   Confirmation that fax was sent successfully was received.     Val Warren RN on 6/5/2023 at 2:04 PM    
[id number]

## 2024-05-22 ENCOUNTER — TELEPHONE (OUTPATIENT)
Dept: INTERNAL MEDICINE | Facility: OTHER | Age: 69
End: 2024-05-22
Payer: COMMERCIAL

## 2024-05-22 NOTE — TELEPHONE ENCOUNTER
Reason for call: Request for a referral.    Referral requested for what concern?  Right thumb pain - orthopedic    Have you already been seen by the specialty you need the referral for?  YES    If yes, Date:   04/2024,  Location:   [facility],  Provider:   Dr. Jay    Preferred method for responding to this message: Telephone Call    Phone number patient can be reached at? Cell number on file:    Telephone Information:   Mobile [phone]       If we can't reach you directly, may we leave a detailed response at the number you provided? Yes    Patient stated the cortisone shot did not help and would like to discuss next steps.        Divina Askew on 5/22/2024 at 9:26 AM

## 2024-05-24 NOTE — TELEPHONE ENCOUNTER
I will have schedulers call her to schedule a follow up.   Mi Ponce LPN .....................5/24/2024 8:33 AM

## 2024-05-31 ENCOUNTER — OFFICE VISIT (OUTPATIENT)
Dept: ORTHOPEDICS | Facility: OTHER | Age: 69
End: 2024-05-31
Attending: SPECIALIST
Payer: COMMERCIAL

## 2024-05-31 DIAGNOSIS — G56.01 CARPAL TUNNEL SYNDROME OF RIGHT WRIST: Primary | ICD-10-CM

## 2024-05-31 PROCEDURE — 99213 OFFICE O/P EST LOW 20 MIN: CPT | Performed by: SPECIALIST

## 2024-05-31 PROCEDURE — G0463 HOSPITAL OUTPT CLINIC VISIT: HCPCS

## 2024-05-31 NOTE — PROGRESS NOTES
Subjective:    Patient returns for follow-up respect to her right thumb pain.  She has been noticing numbness about the hand not well localized.  She sleeps poorly because of this.  Right thumb basilar joint was injected with some relief.    Objective:    Examination shows basal joint tenderness with a positive grind maneuver palmar abduction is mildly limited.  She does have numbness with provocative maneuvers which compressed the median nerve.  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    Plan: Will obtain EMG studies of the right upper extremity I will contact her when the results become available we will make recommendations.        Nicolas Jay MD

## 2024-06-18 ENCOUNTER — OFFICE VISIT (OUTPATIENT)
Dept: NEUROLOGY | Facility: OTHER | Age: 69
End: 2024-06-18
Attending: PSYCHIATRY & NEUROLOGY
Payer: COMMERCIAL

## 2024-06-18 VITALS
OXYGEN SATURATION: 95 % | RESPIRATION RATE: 18 BRPM | SYSTOLIC BLOOD PRESSURE: 146 MMHG | HEART RATE: 70 BPM | WEIGHT: 173.6 LBS | HEIGHT: 61 IN | BODY MASS INDEX: 32.77 KG/M2 | DIASTOLIC BLOOD PRESSURE: 92 MMHG | TEMPERATURE: 97.5 F

## 2024-06-18 DIAGNOSIS — G56.01 CARPAL TUNNEL SYNDROME OF RIGHT WRIST: ICD-10-CM

## 2024-06-18 DIAGNOSIS — M79.641 PAIN OF RIGHT HAND: Primary | ICD-10-CM

## 2024-06-18 PROCEDURE — 95911 NRV CNDJ TEST 9-10 STUDIES: CPT | Mod: 26 | Performed by: PSYCHIATRY & NEUROLOGY

## 2024-06-18 PROCEDURE — G0463 HOSPITAL OUTPT CLINIC VISIT: HCPCS

## 2024-06-18 PROCEDURE — 95911 NRV CNDJ TEST 9-10 STUDIES: CPT | Performed by: PSYCHIATRY & NEUROLOGY

## 2024-06-18 PROCEDURE — 99204 OFFICE O/P NEW MOD 45 MIN: CPT | Mod: 25 | Performed by: PSYCHIATRY & NEUROLOGY

## 2024-06-18 PROCEDURE — 95886 MUSC TEST DONE W/N TEST COMP: CPT | Performed by: PSYCHIATRY & NEUROLOGY

## 2024-06-18 PROCEDURE — 95886 MUSC TEST DONE W/N TEST COMP: CPT | Mod: 26 | Performed by: PSYCHIATRY & NEUROLOGY

## 2024-06-18 RX ORDER — CHLORAL HYDRATE 500 MG
1 CAPSULE ORAL 2 TIMES DAILY
COMMUNITY

## 2024-06-18 ASSESSMENT — PAIN SCALES - GENERAL: PAINLEVEL: SEVERE PAIN (6)

## 2024-06-18 NOTE — PROGRESS NOTES
Referring physician: Nicolas Jay MD    History: The patient relates about a 1 year history of increasing problems with pain in the right hand.  She has pain at the base of the right thumb especially with abduction against resistance.  She has recurrent diffuse paresthesia which she says affects the entire hand.  This sometimes occurs out of sleep.  She believes she is losing strength in the hand but sounds to have some trouble distinguishing between limitations from pain versus actual loss of strength.    Past medical history: The patient has a history of cardiac disease.  She has been chronically overweight.  She is not diabetic.    Review of systems: 10 system review of systems is negative.  The patient has mild neck pain but does not describe radicular quality pain.    Physical examination: The patient is 61 inches tall and weighs 174 pounds.  She complains of pain at the base of the right thumb with abduction against resistance.  There is grossly normal strength bilaterally for the interossei, finger extensors and flexors, biceps and triceps.  Reflexes are normoactive and symmetric in the upper extremities.    Nerve conduction studies motor nerve conduction testing was performed for the right median and ulnar nerves.  There is moderately severe latency prolongation, conduction block and slowing at the wrist for the median nerve.  The ulnar study is normal.    H reflex latencies obtained for the median and ulnar nerves were normal.    Orthodromic mixed conduction studies were performed for the right median and ulnar nerves.  Antidromic sensory testing was performed for the second digital, fifth digital and radial nerves.  There was moderately severe latency prolongation and slowing at the wrist for the median nerve and for the second digital nerve distally.  Amplitude was reduced at the second digital nerve.    Monopolar EMG needle examination: EMG was performed for the right first dorsal interosseous,  extensor digitorum commonness, flexor carpi radialis, triceps, and brachioradialis.  All of the tested muscles showed normal insertional activity.  Motor units were normal in size with normal recruitment and interference.    Impression: The patient has moderately severe right carpal tunnel syndrome but otherwise looks to be neurologically healthy.  The degree of conduction block seen for the median nerve is great enough that improvement of the carpal tunnel syndrome is unlikely with nonsurgical treatment.  This was discussed with her.

## 2024-06-18 NOTE — LETTER
6/18/2024      Izzy Lopez  [address] [address] [address]      Dear Colleague,    Thank you for referring your patient, Izzy Lopez, to the [facility]. Please see a copy of my visit note below.    Referring physician: Nicolas Jay MD    History: The patient relates about a 1 year history of increasing problems with pain in the right hand.  She has pain at the base of the right thumb especially with abduction against resistance.  She has recurrent diffuse paresthesia which she says affects the entire hand.  This sometimes occurs out of sleep.  She believes she is losing strength in the hand but sounds to have some trouble distinguishing between limitations from pain versus actual loss of strength.    Past medical history: The patient has a history of cardiac disease.  She has been chronically overweight.  She is not diabetic.    Review of systems: 10 system review of systems is negative.  The patient has mild neck pain but does not describe radicular quality pain.    Physical examination: The patient is 61 inches tall and weighs 174 pounds.  She complains of pain at the base of the right thumb with abduction against resistance.  There is grossly normal strength bilaterally for the interossei, finger extensors and flexors, biceps and triceps.  Reflexes are normoactive and symmetric in the upper extremities.    Nerve conduction studies motor nerve conduction testing was performed for the right median and ulnar nerves.  There is moderately severe latency prolongation, conduction block and slowing at the wrist for the median nerve.  The ulnar study is normal.    H reflex latencies obtained for the median and ulnar nerves were normal.    Orthodromic mixed conduction studies were performed for the right median and ulnar nerves.  Antidromic sensory testing was performed for the second digital, fifth digital and radial nerves.  There was moderately severe latency  prolongation and slowing at the wrist for the median nerve and for the second digital nerve distally.  Amplitude was reduced at the second digital nerve.    Monopolar EMG needle examination: EMG was performed for the right first dorsal interosseous, extensor digitorum commonness, flexor carpi radialis, triceps, and brachioradialis.  All of the tested muscles showed normal insertional activity.  Motor units were normal in size with normal recruitment and interference.    Impression: The patient has moderately severe right carpal tunnel syndrome but otherwise looks to be neurologically healthy.  The degree of conduction block seen for the median nerve is great enough that improvement of the carpal tunnel syndrome is unlikely with nonsurgical treatment.  This was discussed with her.    Again, thank you for allowing me to participate in the care of your patient.        Sincerely,        Valerio Nolan MD

## 2024-06-18 NOTE — NURSING NOTE
Chief Complaint   Patient presents with    Confusion     Right hand numbness and pain       Initial There were no vitals taken for this visit. Estimated body mass index is 31.44 kg/m  as calculated from the following:    Height as of 4/11/24: 1.524 m (5').    Weight as of 4/11/24: 73 kg (161 lb).  Medication Review: complete    The next two questions are to help us understand your food security.  If you are feeling you need any assistance in this area, we have resources available to support you today.          1/24/2024   SDOH- Food Insecurity   Within the past 12 months, did you worry that your food would run out before you got money to buy more? N   Within the past 12 months, did the food you bought just not last and you didn t have money to get more? N         Health Care Directive:  Patient does not have a Health Care Directive or Living Will: Discussed advance care planning with patient; however, patient declined at this time.    Christal Peterson

## 2024-06-19 ENCOUNTER — HOSPITAL ENCOUNTER (OUTPATIENT)
Dept: CARDIOLOGY | Facility: OTHER | Age: 69
Discharge: HOME | End: 2024-06-19
Attending: NURSE PRACTITIONER | Admitting: NURSE PRACTITIONER
Payer: MEDICARE

## 2024-06-19 VITALS — SYSTOLIC BLOOD PRESSURE: 165 MMHG | DIASTOLIC BLOOD PRESSURE: 80 MMHG

## 2024-06-19 DIAGNOSIS — R09.89 DEPRESSED LEFT VENTRICULAR EJECTION FRACTION: ICD-10-CM

## 2024-06-19 DIAGNOSIS — I34.0 MILD MITRAL REGURGITATION: ICD-10-CM

## 2024-06-19 LAB — LVEF ECHO: NORMAL

## 2024-06-19 PROCEDURE — 93306 TTE W/DOPPLER COMPLETE: CPT

## 2024-06-19 PROCEDURE — 93306 TTE W/DOPPLER COMPLETE: CPT | Mod: 26 | Performed by: INTERNAL MEDICINE

## 2024-06-24 ENCOUNTER — HOSPITAL ENCOUNTER (OUTPATIENT)
Facility: OTHER | Age: 69
End: 2024-06-24
Attending: SPECIALIST | Admitting: SPECIALIST
Payer: MEDICARE

## 2024-06-26 ENCOUNTER — OFFICE VISIT (OUTPATIENT)
Dept: CARDIOLOGY | Facility: OTHER | Age: 69
End: 2024-06-26
Attending: NURSE PRACTITIONER
Payer: COMMERCIAL

## 2024-06-26 VITALS
DIASTOLIC BLOOD PRESSURE: 76 MMHG | BODY MASS INDEX: 32.88 KG/M2 | SYSTOLIC BLOOD PRESSURE: 120 MMHG | OXYGEN SATURATION: 96 % | WEIGHT: 174 LBS | TEMPERATURE: 98.4 F | RESPIRATION RATE: 16 BRPM | HEART RATE: 68 BPM

## 2024-06-26 DIAGNOSIS — Z95.5 STATUS POST INSERTION OF DRUG-ELUTING STENT INTO LEFT ANTERIOR DESCENDING (LAD) ARTERY: Primary | ICD-10-CM

## 2024-06-26 DIAGNOSIS — E78.5 HYPERLIPIDEMIA LDL GOAL <70: ICD-10-CM

## 2024-06-26 DIAGNOSIS — R25.2 LEG CRAMPING: ICD-10-CM

## 2024-06-26 LAB
MAGNESIUM SERPL-MCNC: 2.1 MG/DL (ref 1.7–2.3)
TSH SERPL DL<=0.005 MIU/L-ACNC: 1.88 UIU/ML (ref 0.3–4.2)
VIT B12 SERPL-MCNC: 522 PG/ML (ref 232–1245)

## 2024-06-26 PROCEDURE — 36415 COLL VENOUS BLD VENIPUNCTURE: CPT | Mod: ZL | Performed by: NURSE PRACTITIONER

## 2024-06-26 PROCEDURE — G0463 HOSPITAL OUTPT CLINIC VISIT: HCPCS

## 2024-06-26 PROCEDURE — 83735 ASSAY OF MAGNESIUM: CPT | Mod: ZL | Performed by: NURSE PRACTITIONER

## 2024-06-26 PROCEDURE — 93005 ELECTROCARDIOGRAM TRACING: CPT | Performed by: NURSE PRACTITIONER

## 2024-06-26 PROCEDURE — 99214 OFFICE O/P EST MOD 30 MIN: CPT | Performed by: NURSE PRACTITIONER

## 2024-06-26 PROCEDURE — 93010 ELECTROCARDIOGRAM REPORT: CPT | Performed by: INTERNAL MEDICINE

## 2024-06-26 PROCEDURE — 84443 ASSAY THYROID STIM HORMONE: CPT | Mod: ZL | Performed by: NURSE PRACTITIONER

## 2024-06-26 PROCEDURE — 82607 VITAMIN B-12: CPT | Mod: ZL | Performed by: NURSE PRACTITIONER

## 2024-06-26 RX ORDER — ROSUVASTATIN CALCIUM 20 MG/1
20 TABLET, COATED ORAL AT BEDTIME
Qty: 90 TABLET | Refills: 3 | Status: SHIPPED | OUTPATIENT
Start: 2024-06-26

## 2024-06-26 ASSESSMENT — PAIN SCALES - GENERAL: PAINLEVEL: SEVERE PAIN (7)

## 2024-06-26 NOTE — PROGRESS NOTES
[facility] HEART CARE   CARDIOLOGY PROGRESS NOTE    Izzy Lopez   [address] [address] [facility] [address]  [facility] [id number]    Antoinette Acharya    Chief Complaint   Patient presents with    Follow Up     9 month follow up, echo results        HPI:   Mrs. Lopez is a 68 year old female who presents for cardiology follow-up to visit on 9/18/2023.  S/p coronary angiography on 2/1/2023 resulting in PCI of the LAD.  Patient was initially evaluated by cardiology on 1/18/2023 endorsing chest pain and increased exertion dyspnea. She underwent TTE revealing mildly depressed LVEF with RWMA concerning for ischemic CM.     Patient has a history notable for long standing HLD- recently started on Atorvastatin, family history of premature CAD on her dads side of the family, chronic back pain with sciatica, obesity and history of diverticulosis. She does have a history of remote tobacco use for which she quit several years ago. Patient has never been diagnosed with HTN or DM.     S/p coronary angiography at [facility] on 2/1/2023 by Dr. Pang.  She was identified to have severe single-vessel CAD, focal 90% stenosis of the pLAD treated with single LINDA (3.0 x 12 mm Synergy).  Discharged on DAPT of aspirin 81 mg daily and Plavix 75 mg daily for optimally 1 year, she will remain on baby ASA life long following LINDA.  Continue on high intensity statin and started on low-dose beta-blocker for cardiac protection.    INTERVAL HISTORY:  Today, patient admits that she has been feeling very good. No chest pain or pressure. No increased dyspnea. No palpitations, occasional orthostatic lightheadedness. Previously discontinued beta-blocker due to bradycardia.  She had not been taking atorvastatin, no side effects to statins.  She is interested in restarting statin today.    RELEVANT TESTING REVIEWED:  Echocardiogram 6/19/2024  Interpretation Summary  Left ventricular size is normal.  Left ventricular function is decreased. The ejection fraction is  50-55%  (borderline).  Right ventricular function, chamber size, wall motion, and thickness are  normal.  Pulmonary artery systolic pressure is normal.  The inferior vena cava is normal.  No pericardial effusion is present.    Echocardiogram 5/15/2023  Interpretation Summary  Patient has bradycardia and feels dizzy .  Left ventricular wall thickness is normal. Mild left ventricular dilation is  present. The visual ejection fraction is 45-50%. Mild diffuse hypokinesis is  present.  Right ventricular function, chamber size, wall motion, and thickness are  normal.  Trace to mild mitral insufficiency is present. Mild tricuspid insufficiency is  present. The aorta root is normal.  The inferior vena cava was normal in size with preserved respiratory  variability. No pericardial effusion is present.  Compared to prior study, LV function appeas to be the same but inferior WMA is  not readily appreciated on this study.    Echocardiogram 2023  Interpretation Summary  Left ventricular function is mildly reduced. The visually estimated ejection  fraction is 45-50%. Mild diffuse hypokinesis which is slightly more pronounced  in the inferolateral segments. Consider ischemic cardiomyopathy.  Global right ventricular function is normal.  No hemodynamically significant valve abnormalities.  The inferior vena cava is normal.  Normal diameter aortic root and proximal ascending aorta. Large atheroma noted  at the sinotubular junction.  There is no prior study for direct comparison.    PAST MEDICAL HISTORY:   Past Medical History:   Diagnosis Date    Hyperlipidemia     No Comments Provided          FAMILY HISTORY:   Family History   Problem Relation Age of Onset    Cancer Mother 60        Cancer,lung    Other - See Comments Father 60        AAA, during repair    Other - See Comments Paternal Grandmother         GI Disease    Other - See Comments Paternal Grandfather         GI Disease    Breast Cancer Sister          Cancer-breast          PAST SURGICAL HISTORY:   Past Surgical History:   Procedure Laterality Date    COLONOSCOPY  2015    hyperplastic, follow up, 24    HYSTERECTOMY TOTAL ABDOMINAL, BILATERAL SALPINGO-OOPHORECTOMY, COMBINED      No Comments Provided    LAPAROSCOPIC TUBAL LIGATION      No Comments Provided    TONSILLECTOMY      No Comments Provided          SOCIAL HISTORY:   Social History     Socioeconomic History    Marital status:      Spouse name: None    Number of children: None    Years of education: None    Highest education level: None   Tobacco Use    Smoking status: Former     Packs/day: 0.50     Years: 5.00     Pack years: 2.50     Types: Cigarettes     Quit date: 1980     Years since quittin.0    Smokeless tobacco: Former   Vaping Use    Vaping Use: Never used   Substance and Sexual Activity    Alcohol use: No     Alcohol/week: 0.0 standard drinks    Drug use: No     Comment: Drug use: Not Asked    Sexual activity: Yes     Partners: Male   Social History Narrative    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          CURRENT MEDICATIONS:   Current Outpatient Medications   Medication Sig Dispense Refill    aspirin (ASA) 81 MG EC tablet Take 81 mg by mouth daily      fish oil-omega-3 fatty acids 1000 MG capsule Take 1 g by mouth 2 times daily      rosuvastatin (CRESTOR) 20 MG tablet Take 1 tablet (20 mg) by mouth at bedtime 90 tablet 3     No current facility-administered medications for this visit.       ALLERGIES:   No Known Allergies     ROS:   CONSTITUTIONAL: No reported fever or chills. No changes in weight.  ENT: No visual disturbance, ear ache, epistaxis or sore throat.   CARDIOVASCULAR: No recurrence of chest pain or pressure.  No palpitations, no edema.   RESPIRATORY: No increased dyspnea.  No cough, wheezing or hemoptysis. No orthopnea or PND.   GI: No reported abdominal pain.  : No reported hematuria or dysuria.   NEUROLOGICAL: No  dizziness, syncope, ataxia, paresthesias or weakness.   HEMATOLOGIC: No history of anemia. No bleeding or excessive bruising. No history of blood clots.   MUSCULOSKELETAL: No new joint pain or swelling, no muscle pain. Positive for chronic back pain.   ENDOCRINOLOGIC: No temperature intolerance. No hair or skin changes.  SKIN: No abnormal rashes or sores, no unusual itching.      PHYSICAL EXAM:   /76 (BP Location: Right arm, Patient Position: Sitting, Cuff Size: Adult Regular)   Pulse 68   Temp 98.4  F (36.9  C) (Temporal)   Resp 16   Wt 78.9 kg (174 lb)   LMP 06/26/1995 (Within Months)   SpO2 96%   BMI 32.88 kg/m    GENERAL: The patient is a well-developed, well-nourished, in no apparent distress.  HEENT: Head is normocephalic and atraumatic. Eyes are symmetrical with normal visual tracking. No icterus, no xanthelasmas. Nares appeared normal without nasal drainage. Mucous membranes are moist, no cyanosis.  NECK: Supple, no cervical bruits, JVP not visible.   CHEST/ LUNGS: Lungs clear to auscultation, no rales, rhonchi or wheezes, no use of accessory muscles, no retractions, respirations unlabored and normal respiratory rate.   CARDIO: Regular rate and rhythm normal with S1 and S2, no S3 or S4 and no murmur, click or rub.   ABD: Abdomen is nondistended.  EXTREMITIES: No edema present.  MUSCULOSKELETAL: No joint swelling.   NEUROLOGIC: Alert and oriented X3. Normal speech, gait and affect. No focal neurologic deficits.   SKIN: No jaundice. No rashes or visible skin lesions present. No ecchymosis.     LAB RESULTS:   Hospital Outpatient Visit on 06/19/2024   Component Date Value Ref Range Status    LVEF  06/19/2024 50-55% (borderline)   Final         ASSESSMENT:   Izzy Lopez presents for cardiology follow-up to visit on 9/18/2023.  S/p coronary angiography on 2/1/2023 resulting in PCI of the LAD.  Patient was initially evaluated by cardiology on 1/18/2023 endorsing chest pain and increased exertion  dyspnea. She underwent TTE revealing mildly depressed LVEF with RWMA concerning for ischemic CM.   Today, patient admits that she has been feeling very good. No chest pain or pressure. No increased dyspnea. No palpitations, occasional orthostatic lightheadedness. Previously discontinued beta-blocker due to bradycardia.  She had not been taking atorvastatin, no side effects to statins.  She is interested in restarting statin today.    1. Status post insertion of drug-eluting stent into left anterior descending (LAD) artery- 2/1/2023 ([facility])  2. Hyperlipidemia LDL goal <70    PLAN:   1. S/p coronary angiography at [facility] on 2/1/2023 by Dr. Pang.  She was identified to have severe single-vessel CAD, focal 90% stenosis of the pLAD treated with single LINDA (3.0 x 12 mm Synergy).    2. Remain on ASA 81 mg daily indefinitely. Start rosuvastatin 20 mg nightly with goal LDL <70.  She has remained on fish oil supplement.  3.  Patient with history of ischemic CM. Previously reviewed echocardiogram from 5/15/2023 revealing mildly depressed LVEF at 45-50%, mild LV dilation, inferior wall motion abnormality resolved following PCI.  4.  Last echocardiogram on 6/19/2024 revealing recovered LV systolic function with an LVEF of 55%, no RWMA's.  LVIDD 5.4 cm.  5. Labs today, see orders.   6.  Follow-up with cardiology in 1 year, sooner if needed.      Orders Placed This Encounter   Procedures    Magnesium    TSH Reflex GH    Vitamin B12    EKG 12-lead, tracing only (Same Day)       Thank you for allowing me to participate in the care of your patient. Please do not hesitate to contact me if you have any questions.     Rebecca Porter, APRN CNP [facility]

## 2024-06-26 NOTE — NURSING NOTE
"Chief Complaint   Patient presents with    Follow Up     9 month follow up, echo results       Initial /76 (BP Location: Right arm, Patient Position: Sitting, Cuff Size: Adult Regular)   Pulse 68   Temp 98.4  F (36.9  C) (Temporal)   Resp 16   Wt 78.9 kg (174 lb)   LMP 06/26/1995 (Within Months)   SpO2 96%   BMI 32.88 kg/m   Estimated body mass index is 32.88 kg/m  as calculated from the following:    Height as of 6/18/24: 1.549 m (5' 1\").    Weight as of this encounter: 78.9 kg (174 lb).  Meds Reconciled: complete  Pt is on Aspirin  Pt is on a Statin  PHQ and/or MAGALY reviewed. Pt referred to PCP/MH Provider as appropriate.    Tiffany Osborn LPN    "

## 2024-06-26 NOTE — PATIENT INSTRUCTIONS
Start Crestor 20 mg at bedtime.   Continue on fish oil supplement.   Remain on Aspirin 81 mg daily long standing.   Labs today, we will call you with results.   Follow-up with cardiology in one year.

## 2024-07-01 LAB
ATRIAL RATE - MUSE: 57 BPM
DIASTOLIC BLOOD PRESSURE - MUSE: NORMAL MMHG
INTERPRETATION ECG - MUSE: NORMAL
P AXIS - MUSE: 65 DEGREES
PR INTERVAL - MUSE: 130 MS
QRS DURATION - MUSE: 86 MS
QT - MUSE: 444 MS
QTC - MUSE: 432 MS
R AXIS - MUSE: 64 DEGREES
SYSTOLIC BLOOD PRESSURE - MUSE: NORMAL MMHG
T AXIS - MUSE: 56 DEGREES
VENTRICULAR RATE- MUSE: 57 BPM

## 2024-07-02 ENCOUNTER — OFFICE VISIT (OUTPATIENT)
Dept: INTERNAL MEDICINE | Facility: OTHER | Age: 69
End: 2024-07-02
Payer: COMMERCIAL

## 2024-07-02 VITALS
OXYGEN SATURATION: 96 % | WEIGHT: 171.8 LBS | DIASTOLIC BLOOD PRESSURE: 74 MMHG | SYSTOLIC BLOOD PRESSURE: 120 MMHG | RESPIRATION RATE: 16 BRPM | HEIGHT: 61 IN | HEART RATE: 64 BPM | BODY MASS INDEX: 32.44 KG/M2 | TEMPERATURE: 97.1 F

## 2024-07-02 DIAGNOSIS — I70.0 ATHEROSCLEROSIS OF AORTA (H): ICD-10-CM

## 2024-07-02 DIAGNOSIS — E78.5 HYPERLIPIDEMIA LDL GOAL <70: ICD-10-CM

## 2024-07-02 DIAGNOSIS — H25.9 SENILE CATARACT OF RIGHT EYE, UNSPECIFIED AGE-RELATED CATARACT TYPE: ICD-10-CM

## 2024-07-02 DIAGNOSIS — E66.9 OBESITY (BMI 30-39.9): ICD-10-CM

## 2024-07-02 DIAGNOSIS — N18.31 STAGE 3A CHRONIC KIDNEY DISEASE (H): ICD-10-CM

## 2024-07-02 DIAGNOSIS — Z01.818 PREOP GENERAL PHYSICAL EXAM: Primary | ICD-10-CM

## 2024-07-02 DIAGNOSIS — Z95.5 STATUS POST INSERTION OF DRUG-ELUTING STENT INTO LEFT ANTERIOR DESCENDING (LAD) ARTERY: ICD-10-CM

## 2024-07-02 LAB
ALBUMIN SERPL BCG-MCNC: 4.3 G/DL (ref 3.5–5.2)
ALP SERPL-CCNC: 87 U/L (ref 40–150)
ALT SERPL W P-5'-P-CCNC: 20 U/L (ref 0–50)
ANION GAP SERPL CALCULATED.3IONS-SCNC: 9 MMOL/L (ref 7–15)
AST SERPL W P-5'-P-CCNC: 21 U/L (ref 0–45)
BASOPHILS # BLD AUTO: 0.1 10E3/UL (ref 0–0.2)
BASOPHILS NFR BLD AUTO: 1 %
BILIRUB SERPL-MCNC: 0.3 MG/DL
BUN SERPL-MCNC: 28.4 MG/DL (ref 8–23)
CALCIUM SERPL-MCNC: 9.7 MG/DL (ref 8.8–10.2)
CHLORIDE SERPL-SCNC: 108 MMOL/L (ref 98–107)
CREAT SERPL-MCNC: 0.92 MG/DL (ref 0.51–0.95)
DEPRECATED HCO3 PLAS-SCNC: 21 MMOL/L (ref 22–29)
EGFRCR SERPLBLD CKD-EPI 2021: 67 ML/MIN/1.73M2
EOSINOPHIL # BLD AUTO: 0.2 10E3/UL (ref 0–0.7)
EOSINOPHIL NFR BLD AUTO: 2 %
ERYTHROCYTE [DISTWIDTH] IN BLOOD BY AUTOMATED COUNT: 13.1 % (ref 10–15)
GLUCOSE SERPL-MCNC: 94 MG/DL (ref 70–99)
HCT VFR BLD AUTO: 45.6 % (ref 35–47)
HGB BLD-MCNC: 14.9 G/DL (ref 11.7–15.7)
IMM GRANULOCYTES # BLD: 0 10E3/UL
IMM GRANULOCYTES NFR BLD: 0 %
LYMPHOCYTES # BLD AUTO: 3.6 10E3/UL (ref 0.8–5.3)
LYMPHOCYTES NFR BLD AUTO: 34 %
MCH RBC QN AUTO: 30.5 PG (ref 26.5–33)
MCHC RBC AUTO-ENTMCNC: 32.7 G/DL (ref 31.5–36.5)
MCV RBC AUTO: 93 FL (ref 78–100)
MONOCYTES # BLD AUTO: 0.9 10E3/UL (ref 0–1.3)
MONOCYTES NFR BLD AUTO: 9 %
NEUTROPHILS # BLD AUTO: 5.7 10E3/UL (ref 1.6–8.3)
NEUTROPHILS NFR BLD AUTO: 54 %
NRBC # BLD AUTO: 0 10E3/UL
NRBC BLD AUTO-RTO: 0 /100
PLATELET # BLD AUTO: 268 10E3/UL (ref 150–450)
POTASSIUM SERPL-SCNC: 5.3 MMOL/L (ref 3.4–5.3)
PROT SERPL-MCNC: 7.3 G/DL (ref 6.4–8.3)
RBC # BLD AUTO: 4.88 10E6/UL (ref 3.8–5.2)
SODIUM SERPL-SCNC: 138 MMOL/L (ref 135–145)
WBC # BLD AUTO: 10.4 10E3/UL (ref 4–11)

## 2024-07-02 PROCEDURE — G0463 HOSPITAL OUTPT CLINIC VISIT: HCPCS

## 2024-07-02 PROCEDURE — 80053 COMPREHEN METABOLIC PANEL: CPT | Mod: ZL

## 2024-07-02 PROCEDURE — G2211 COMPLEX E/M VISIT ADD ON: HCPCS

## 2024-07-02 PROCEDURE — 36415 COLL VENOUS BLD VENIPUNCTURE: CPT | Mod: ZL

## 2024-07-02 PROCEDURE — 99214 OFFICE O/P EST MOD 30 MIN: CPT

## 2024-07-02 PROCEDURE — 85025 COMPLETE CBC W/AUTO DIFF WBC: CPT | Mod: ZL

## 2024-07-02 ASSESSMENT — PAIN SCALES - GENERAL: PAINLEVEL: MODERATE PAIN (5)

## 2024-07-02 NOTE — PATIENT INSTRUCTIONS
How to Take Your Medication Before Surgery  Preoperative Medication Instructions   Antiplatelet or Anticoagulation Medication Instructions   - Bleeding risk is low for this procedure (e.g. dental, skin, cataract).    Additional Medication Instructions  Take all scheduled medications on the day of surgery       Patient Education   Preparing for Your Surgery  Getting started  A nurse will call you to review your health history and instructions. They will give you an arrival time based on your scheduled surgery time. Please be ready to share:  Your doctor's clinic name and phone number  Your medical, surgical, and anesthesia history  A list of allergies and sensitivities  A list of medicines, including herbal treatments and over-the-counter drugs  Whether the patient has a legal guardian (ask how to send us the papers in advance)  Please tell us if you're pregnant--or if there's any chance you might be pregnant. Some surgeries may injure a fetus (unborn baby), so they require a pregnancy test. Surgeries that are safe for a fetus don't always need a test, and you can choose whether to have one.   If you have a child who's having surgery, please ask for a copy of Preparing for Your Child's Surgery.    Preparing for surgery  Within 10 to 30 days of surgery: Have a pre-op exam (sometimes called an H&P, or History and Physical). This can be done at a clinic or pre-operative center.  If you're having a , you may not need this exam. Talk to your care team.  At your pre-op exam, talk to your care team about all medicines you take. If you need to stop any medicines before surgery, ask when to start taking them again.  We do this for your safety. Many medicines can make you bleed too much during surgery. Some change how well surgery (anesthesia) drugs work.  Call your insurance company to let them know you're having surgery. (If you don't have insurance, call [phone].)  Call your clinic if there's any change in  your health. This includes signs of a cold or flu (sore throat, runny nose, cough, rash, fever). It also includes a scrape or scratch near the surgery site.  If you have questions on the day of surgery, call your hospital or surgery center.  Eating and drinking guidelines  For your safety: Unless your surgeon tells you otherwise, follow the guidelines below.  Eat and drink as usual until 8 hours before you arrive for surgery. After that, no food or milk.  Drink clear liquids until 2 hours before you arrive. These are liquids you can see through, like water, Gatorade, and Propel Water. They also include plain black coffee and tea (no cream or milk), candy, and breath mints. You can spit out gum when you arrive.  If you drink alcohol: Stop drinking it the night before surgery.  If your care team tells you to take medicine on the morning of surgery, it's okay to take it with a sip of water.  Preventing infection  Shower or bathe the night before and morning of your surgery. Follow the instructions your clinic gave you. (If no instructions, use regular soap.)  Don't shave or clip hair near your surgery site. We'll remove the hair if needed.  Don't smoke or vape the morning of surgery. You may chew nicotine gum up to 2 hours before surgery. A nicotine patch is okay.  Note: Some surgeries require you to completely quit smoking and nicotine. Check with your surgeon.  Your care team will make every effort to keep you safe from infection. We will:  Clean our hands often with soap and water (or an alcohol-based hand rub).  Clean the skin at your surgery site with a special soap that kills germs.  Give you a special gown to keep you warm. (Cold raises the risk of infection.)  Wear special hair covers, masks, gowns and gloves during surgery.  Give antibiotic medicine, if prescribed. Not all surgeries need antibiotics.  What to bring on the day of surgery  Photo ID and insurance card  Copy of your health care directive, if you  have one  Glasses and hearing aids (bring cases)  You can't wear contacts during surgery  Inhaler and eye drops, if you use them (tell us about these when you arrive)  CPAP machine or breathing device, if you use them  A few personal items, if spending the night  If you have . . .  A pacemaker, ICD (cardiac defibrillator) or other implant: Bring the ID card.  An implanted stimulator: Bring the remote control.  A legal guardian: Bring a copy of the certified (court-stamped) guardianship papers.  Please remove any jewelry, including body piercings. Leave jewelry and other valuables at home.  If you're going home the day of surgery  You must have a responsible adult drive you home. They should stay with you overnight as well.  If you don't have someone to stay with you, and you aren't safe to go home alone, we may keep you overnight. Insurance often won't pay for this.  After surgery  If it's hard to control your pain or you need more pain medicine, please call your surgeon's office.  Questions?   If you have any questions for your care team, list them here: _________________________________________________________________________________________________________________________________________________________________________ ____________________________________ ____________________________________ ____________________________________  For informational purposes only. Not to replace the advice of your health care provider. Copyright   2003, 2019 [facility]. All rights reserved. Clinically reviewed by Izzy Baez MD. [employer] [id number] - REV 12/22.

## 2024-07-02 NOTE — PROGRESS NOTES
Preoperative Evaluation  [facility]  [address]  [place] [address]  Phone: [phone]  Fax: [phone]  Primary Provider: AKBAR Braxton CNP  Pre-op Performing Provider: AKBAR Braxton CNP  Jul 2, 2024 7/2/2024   Surgical Information   What procedure is being done? Cataract right eye   Facility or Hospital where procedure/surgery will be performed: [facility]   Who is doing the procedure / surgery? Dr. Darron Bradshaw   Date of surgery / procedure: July 9th   Time of surgery / procedure: TBD   Where do you plan to recover after surgery? at home alone        Fax number for surgical facility: Not provided    Assessment & Plan     The proposed surgical procedure is considered LOW risk.      ICD-10-CM    1. Preop general physical exam  Z01.818 CBC with Platelets & Differential ([facility] Only)     Comprehensive Metabolic Panel     CBC with Platelets & Differential ([facility] Only)     Comprehensive Metabolic Panel      2. Senile cataract of right eye, unspecified age-related cataract type  H25.9       3. Obesity (BMI 30-39.9)  E66.9       4. Hyperlipidemia LDL goal <70  E78.5       5. Stage 3a chronic kidney disease (H)  N18.31       6. Status post insertion of drug-eluting stent into left anterior descending (LAD) artery- 2/1/2023  Z95.5       7. Atherosclerosis of aorta (H24)  I70.0             - No identified additional risk factors other than previously addressed    Antiplatelet or Anticoagulation Medication Instructions   - Bleeding risk is low for this procedure (e.g. dental, skin, cataract).    Additional Medication Instructions  Take all scheduled medications on the day of surgery    Recommendation  Approval given to proceed with proposed procedure, without further diagnostic evaluation.    Mary Magana is a 68 year old, presenting for the following:      HPI related to upcoming procedure: Patient presents to clinic for  preoperative evaluation for right eye cataract surgery.  She has been having increased difficulty with her vision related to the cataract. Patient denies having any difficulties with anesthesia in the past, and is able to meet > 4 METS.         7/2/2024   Pre-Op Questionnaire   Have you ever had a heart attack or stroke? No   Have you ever had surgery on your heart or blood vessels, such as a stent placement, a coronary artery bypass, or surgery on an artery in your head, neck, heart, or legs? (!) YES S/P drug-eluting stent LAD 2/1/2023   Do you have chest pain with activity? No   Do you have a history of heart failure? No   Do you currently have a cold, bronchitis or symptoms of other infection? No   Do you have a cough, shortness of breath, or wheezing? No   Do you or anyone in your family have previous history of blood clots? No   Do you or does anyone in your family have a serious bleeding problem such as prolonged bleeding following surgeries or cuts? No   Have you ever had problems with anemia or been told to take iron pills? No   Have you had any abnormal blood loss such as black, tarry or bloody stools, or abnormal vaginal bleeding? No   Have you ever had a blood transfusion? No   Are you willing to have a blood transfusion if it is medically needed before, during, or after your surgery? Yes   Have you or any of your relatives ever had problems with anesthesia? No   Do you have sleep apnea, excessive snoring or daytime drowsiness? No   Do you have any artifical heart valves or other implanted medical devices like a pacemaker, defibrillator, or continuous glucose monitor? No   Do you have artificial joints? No   Are you allergic to latex? No        Health Care Directive  Patient does not have a Health Care Directive or Living Will: Patient states has Advance Directive and will bring in a copy to clinic.    Preoperative Review of    reviewed - no record of controlled substances prescribed.      Status of  Chronic Conditions:  See problem list for active medical problems.  Problems all longstanding and stable, except as noted/documented.  See ROS for pertinent symptoms related to these conditions.    CAD - Patient has a longstanding history of moderate-severe CAD. Patient denies recent chest pain or NTG use, denies exercise induced dyspnea or PND.     HYPERLIPIDEMIA - Patient has a long history of significant Hyperlipidemia requiring medication for treatment with recent good control. Patient reports no problems or side effects with the medication.     Patient Active Problem List    Diagnosis Date Noted    Acute HFrEF (heart failure with reduced ejection fraction) (H) 01/24/2024     Priority: Medium    Stage 3a chronic kidney disease (H) 01/24/2024     Priority: Medium    Status post insertion of drug-eluting stent into left anterior descending (LAD) artery- 2/1/2023 02/15/2023     Priority: Medium    Depressed left ventricular ejection fraction 01/20/2023     Priority: Medium    Family history of ischemic heart disease 12/14/2022     Priority: Medium    Hyperlipidemia LDL goal <70 12/14/2022     Priority: Medium    Atherosclerosis of aorta (H24) 12/14/2022     Priority: Medium    Chronic bilateral low back pain with sciatica 04/11/2019     Priority: Medium    Ingrown toenail 10/04/2018     Priority: Medium    Intrinsic atopic dermatitis 09/28/2018     Priority: Medium    Actinic keratosis 09/28/2018     Priority: Medium    Benign neoplasm of skin of trunk, except scrotum 09/28/2018     Priority: Medium    Diverticulosis of sigmoid colon 11/23/2015     Priority: Medium    Health care maintenance 11/17/2015     Priority: Medium    Obesity (BMI 30-39.9) 08/31/2015     Priority: Medium      Past Medical History:   Diagnosis Date    Hyperlipidemia     No Comments Provided     Past Surgical History:   Procedure Laterality Date    COLONOSCOPY  11/23/2015    hyperplastic, follow up, 11/23/25    HYSTERECTOMY TOTAL ABDOMINAL,  "BILATERAL SALPINGO-OOPHORECTOMY, COMBINED      No Comments Provided    LAPAROSCOPIC TUBAL LIGATION      No Comments Provided    TONSILLECTOMY      No Comments Provided     Current Outpatient Medications   Medication Sig Dispense Refill    aspirin (ASA) 81 MG EC tablet Take 81 mg by mouth daily      fish oil-omega-3 fatty acids 1000 MG capsule Take 1 g by mouth 2 times daily      rosuvastatin (CRESTOR) 20 MG tablet Take 1 tablet (20 mg) by mouth at bedtime 90 tablet 3       No Known Allergies     Social History     Tobacco Use    Smoking status: Former     Current packs/day: 0.00     Average packs/day: 0.5 packs/day for 5.0 years (2.5 ttl pk-yrs)     Types: Cigarettes     Start date: 1975     Quit date: 1980     Years since quittin.5     Passive exposure: Past    Smokeless tobacco: Never    Tobacco comments:     Passive exposure in childhood home.    Substance Use Topics    Alcohol use: Yes     Comment: 2 per month       History   Drug Use Unknown             Review of Systems  CONSTITUTIONAL: NEGATIVE for fever, chills, change in weight  ENT/MOUTH: NEGATIVE for ear, mouth and throat problems  RESP: NEGATIVE for significant cough or SOB  CV: NEGATIVE for chest pain, palpitations or peripheral edema    Objective    /74 (BP Location: Right arm, Patient Position: Sitting, Cuff Size: Adult Regular)   Pulse 64   Temp 97.1  F (36.2  C) (Tympanic)   Resp 16   Ht 1.549 m (5' 1\")   Wt 77.9 kg (171 lb 12.8 oz)   LMP 1995 (Within Months)   SpO2 96%   BMI 32.46 kg/m     Estimated body mass index is 32.46 kg/m  as calculated from the following:    Height as of this encounter: 1.549 m (5' 1\").    Weight as of this encounter: 77.9 kg (171 lb 12.8 oz).  Physical Exam  GENERAL: alert and no distress  NECK: no adenopathy, no asymmetry, masses, or scars  RESP: lungs clear to auscultation - no rales, rhonchi or wheezes  CV: regular rate and rhythm, normal S1 S2, no S3 or S4, no murmur, click or rub, no " peripheral edema  ABDOMEN: soft, nontender, no hepatosplenomegaly, no masses and bowel sounds normal  MS: no gross musculoskeletal defects noted, no edema    Recent Labs   Lab Test 01/24/24  1041   HGB 14.7         POTASSIUM 4.5   CR 0.97*        Diagnostics  Recent Results (from the past 24 hour(s))   Comprehensive Metabolic Panel    Collection Time: 07/02/24 10:27 AM   Result Value Ref Range    Sodium 138 135 - 145 mmol/L    Potassium 5.3 3.4 - 5.3 mmol/L    Carbon Dioxide (CO2) 21 (L) 22 - 29 mmol/L    Anion Gap 9 7 - 15 mmol/L    Urea Nitrogen 28.4 (H) 8.0 - 23.0 mg/dL    Creatinine 0.92 0.51 - 0.95 mg/dL    GFR Estimate 67 >60 mL/min/1.73m2    Calcium 9.7 8.8 - 10.2 mg/dL    Chloride 108 (H) 98 - 107 mmol/L    Glucose 94 70 - 99 mg/dL    Alkaline Phosphatase 87 40 - 150 U/L    AST 21 0 - 45 U/L    ALT 20 0 - 50 U/L    Protein Total 7.3 6.4 - 8.3 g/dL    Albumin 4.3 3.5 - 5.2 g/dL    Bilirubin Total 0.3 <=1.2 mg/dL   CBC with platelets and differential    Collection Time: 07/02/24 10:27 AM   Result Value Ref Range    WBC Count 10.4 4.0 - 11.0 10e3/uL    RBC Count 4.88 3.80 - 5.20 10e6/uL    Hemoglobin 14.9 11.7 - 15.7 g/dL    Hematocrit 45.6 35.0 - 47.0 %    MCV 93 78 - 100 fL    MCH 30.5 26.5 - 33.0 pg    MCHC 32.7 31.5 - 36.5 g/dL    RDW 13.1 10.0 - 15.0 %    Platelet Count 268 150 - 450 10e3/uL    % Neutrophils 54 %    % Lymphocytes 34 %    % Monocytes 9 %    % Eosinophils 2 %    % Basophils 1 %    % Immature Granulocytes 0 %    NRBCs per 100 WBC 0 <1 /100    Absolute Neutrophils 5.7 1.6 - 8.3 10e3/uL    Absolute Lymphocytes 3.6 0.8 - 5.3 10e3/uL    Absolute Monocytes 0.9 0.0 - 1.3 10e3/uL    Absolute Eosinophils 0.2 0.0 - 0.7 10e3/uL    Absolute Basophils 0.1 0.0 - 0.2 10e3/uL    Absolute Immature Granulocytes 0.0 <=0.4 10e3/uL    Absolute NRBCs 0.0 10e3/uL      No EKG required for low risk surgery (cataract, skin procedure, breast biopsy, etc).    Revised Cardiac Risk Index (RCRI)  The patient  has the following serious cardiovascular risks for perioperative complications:   - Coronary Artery Disease (MI, positive stress test, angina, Qs on EKG) = 1 point     RCRI Interpretation: 1 point: Class II (low risk - 0.9% complication rate)         Signed Electronically by: AKBAR Braxton CNP  Copy of this evaluation report is provided to requesting physician.           The longitudinal plan of care for the diagnosis(es)/condition(s) as documented were addressed during this visit. Due to the added complexity in care, I will continue to support Izzy in the subsequent management and with ongoing continuity of care.

## 2024-07-02 NOTE — NURSING NOTE
"Chief Complaint   Patient presents with    Pre-Op Exam     Patient presents to the clinic today for her pre-op visit.   Initial /74 (BP Location: Right arm, Patient Position: Sitting, Cuff Size: Adult Regular)   Pulse 64   Temp 97.1  F (36.2  C) (Tympanic)   Resp 16   Ht 1.549 m (5' 1\")   Wt 77.9 kg (171 lb 12.8 oz)   LMP 06/26/1995 (Within Months)   SpO2 96%   BMI 32.46 kg/m   Estimated body mass index is 32.46 kg/m  as calculated from the following:    Height as of this encounter: 1.549 m (5' 1\").    Weight as of this encounter: 77.9 kg (171 lb 12.8 oz).  Medication Review: complete    The next two questions are to help us understand your food security.  If you are feeling you need any assistance in this area, we have resources available to support you today.          1/24/2024   SDOH- Food Insecurity   Within the past 12 months, did you worry that your food would run out before you got money to buy more? N   Within the past 12 months, did the food you bought just not last and you didn t have money to get more? N            Health Care Directive:  Patient does not have a Health Care Directive or Living Will: Discussed advance care planning with patient; however, patient declined at this time.    Kortney Torres      "

## 2024-07-09 ENCOUNTER — TRANSFERRED RECORDS (OUTPATIENT)
Dept: HEALTH INFORMATION MANAGEMENT | Facility: OTHER | Age: 69
End: 2024-07-09
Payer: COMMERCIAL

## 2024-07-12 DIAGNOSIS — G56.01 CARPAL TUNNEL SYNDROME OF RIGHT WRIST: Primary | ICD-10-CM

## 2024-07-12 DIAGNOSIS — M18.11 PRIMARY OSTEOARTHRITIS OF FIRST CARPOMETACARPAL JOINT OF RIGHT HAND: ICD-10-CM

## 2024-08-08 ENCOUNTER — TELEPHONE (OUTPATIENT)
Dept: INTERNAL MEDICINE | Facility: OTHER | Age: 69
End: 2024-08-08
Payer: COMMERCIAL

## 2024-08-08 NOTE — TELEPHONE ENCOUNTER
After Proper verification, patient stated that Dr. Jay will addend his note for her visit on Monday to accommodate the Pre op.  Nevin Maxwell LPN on 8/8/2024 at 3:34 PM  EXT. [id number]

## 2024-08-08 NOTE — TELEPHONE ENCOUNTER
Patient is wondering if she is able to get a letter stating that she does not need another pre op since she had 3 of them in the last 6 weeks. Does not feel like she should have to do another one    Sofia Leroy on 8/8/2024 at 3:18 PM

## 2024-08-15 ENCOUNTER — THERAPY VISIT (OUTPATIENT)
Dept: OCCUPATIONAL THERAPY | Facility: OTHER | Age: 69
End: 2024-08-15
Attending: SPECIALIST
Payer: MEDICARE

## 2024-08-15 DIAGNOSIS — M18.11 PRIMARY OSTEOARTHRITIS OF FIRST CARPOMETACARPAL JOINT OF RIGHT HAND: Primary | ICD-10-CM

## 2024-08-15 DIAGNOSIS — G56.01 CARPAL TUNNEL SYNDROME OF RIGHT WRIST: ICD-10-CM

## 2024-08-15 PROCEDURE — 97535 SELF CARE MNGMENT TRAINING: CPT | Mod: GO | Performed by: OCCUPATIONAL THERAPIST

## 2024-08-15 PROCEDURE — 97760 ORTHOTIC MGMT&TRAING 1ST ENC: CPT | Mod: GO | Performed by: OCCUPATIONAL THERAPIST

## 2024-08-15 NOTE — PROGRESS NOTES
08/15/24 0500   Appointment Info   Treating Provider Belen Carroll OTR/L   Visits Used 1   Medical Diagnosis Status post CMC arthroplasty and endoscopic carpal tunnel release   OT Tx Diagnosis Impaired self-cares, writing, and household tasks   Precautions/Limitations Per protocol   Progress Note/Certification   Start Of Care Date 08/15/24   Onset of Illness/Injury or Date of Surgery 08/12/24   Therapy Frequency 1-2 times per week   Predicted Duration 8 weeks   Certification date from 08/15/24   Certification date to 10/10/24   KX Modifier Statement I certify the need for these services furnished under this plan of treatment and while under my care.  (Physician co-signature of this document indicates review and certification of the therapy plan)   Progress Note Completed Date 08/15/24   Subjective Report   Subjective Report Izzy arrives with postsurgical dressing intact and wearing a sling on her right upper extremity.  She reports her hand is very painful and swollen.  She thinks the dressing is too tight on the proximal and.  She is reporting pain as high as an 8 out of 10 mostly achy she also reports burning tingling and loss of strength with swelling and pain.   Self Care/Home Management   Self-Care/Home Mgmt/ADL, Compensatory, Meal Prep Minutes (03465) 25 Minutes   Self Care 1 Postsurgical dressing was removed, all sutures were in dry and intact, sutures will be left in.  Educated and hygiene and restrictions related to wound care.   Skilled Intervention Remove postsurgical dressing education where in care and hygiene   Orthotics   Orthotic Assessment, Initial session minutes (29886) 30   HAND Splinting Finger/Thumb   Skilled Intervention Fabricated orthosis, educated in donning and doffing, wearing care, modified   Patient Response/Progress Patient reports comfort with the orthosis   Finger/Thumb Splint Desciption Thumb spica   Wear Schedule Full time   Off for: Hygiene;Exercise   Total Session Time    Timed Code Treatment Minutes 55   Total Treatment Time (sum of timed and untimed services) 55

## 2024-08-20 ENCOUNTER — THERAPY VISIT (OUTPATIENT)
Dept: OCCUPATIONAL THERAPY | Facility: OTHER | Age: 69
End: 2024-08-20
Attending: SPECIALIST
Payer: MEDICARE

## 2024-08-20 DIAGNOSIS — M18.11 PRIMARY OSTEOARTHRITIS OF FIRST CARPOMETACARPAL JOINT OF RIGHT HAND: Primary | ICD-10-CM

## 2024-08-20 PROCEDURE — 97165 OT EVAL LOW COMPLEX 30 MIN: CPT | Mod: GO | Performed by: OCCUPATIONAL THERAPIST

## 2024-08-20 PROCEDURE — 97763 ORTHC/PROSTC MGMT SBSQ ENC: CPT | Mod: GO,XU | Performed by: OCCUPATIONAL THERAPIST

## 2024-08-20 PROCEDURE — 97535 SELF CARE MNGMENT TRAINING: CPT | Mod: GO | Performed by: OCCUPATIONAL THERAPIST

## 2024-08-20 NOTE — PROGRESS NOTES
OCCUPATIONAL THERAPY EVALUATION  Type of Visit: Evaluation              Subjective      Presenting condition or subjective complaint:    Date of onset: 08/12/24    Relevant medical history:     Dates & types of surgery:      Prior diagnostic imaging/testing results:       Prior therapy history for the same diagnosis, illness or injury:        Prior Level of Function  Transfers: Independent  Ambulation: Independent  ADL: Independent  IADL: Driving, Housekeeping, Laundry, Work, Yard work    Patient goals for therapy:  to use hand with out pain    Pain assessment: See objective evaluation for additional pain details     Objective   See flow sheet    Assessment & Plan   CLINICAL IMPRESSIONS  Medical Diagnosis: Status post CMC arthroplasty and endoscopic carpal tunnel release    Treatment Diagnosis: Impaired self-cares, writing, and household tasks    Impression/Assessment: Pt is a 69 year old female presenting to Occupational Therapy due to s/p cmc arthoplasty.  The following significant findings have been identified: Impaired ROM and Impaired strength.  These identified deficits interfere with their ability to perform work tasks, recreational activities, and household chores as compared to previous level of function.   Patient's limitations or Problem List includes: Decreased ROM/motion, Increased edema, Weakness, and Decreased  of the right thumb which interferes with the patient's ability to perform Work Tasks, Recreational Activities, and Household Chores as compared to previous level of function.    Clinical Decision Making (Complexity):  Assessment of Occupational Performance: 1-3 Performance Deficits  Occupational Performance Limitations: work and leisure activities  Clinical Decision Making (Complexity): Low complexity    PLAN OF CARE  Treatment Interventions:  Modalities: Cryotherapy, Infrared, Paraffin, Ultrasound, Vasoneumatic Device  Interventions: Self-Care/Home Management, Therapeutic Activity,  Therapeutic Exercise, Manual Therapy, Neuromuscular Re-education, Orthotic Fitting/Training    Long Term Goals   OT Goal 1  Goal Identifier: wrist ROM  Goal Description: pt will have 100 degrees of PLUNKETT in wrist  Rationale: In order to maximize safety and independence with performance of self-care activities  Target Date: 10/01/24  OT Goal 2  Goal Identifier: hand strength  Goal Description: Pt will lhave  strength with in 5# of left  Rationale: In order to maximize safety and independence with performance of self-care activities  Target Date: 10/29/24  OT Goal 3  Goal Identifier: fisting  Goal Description: Pt wuill make a composite fist to hold solverware  Rationale: In order to maximize safety and independence with performance of self-care activities  Target Date: 10/22/24      Frequency of Treatment: 1-2 times per week  Duration of Treatment: 8 weeks     Recommended Referrals to Other Professionals:   Education Assessment: Learner/Method: Patient;Listening;Demonstration;Pictures/Video;No Barriers to Learning     Risks and benefits of evaluation/treatment have been explained.   Patient/Family/caregiver agrees with Plan of Care.     Evaluation Time:    OT Eval, Low Complexity Minutes (43432): 15       Signing Clinician: Belen Carroll OT        [facility]                                                                                   OUTPATIENT OCCUPATIONAL THERAPY      PLAN OF TREATMENT FOR OUTPATIENT REHABILITATION   Patient's Last Name, First Name, Izzy Holbrook YOB: 1955   Provider's Name   [facility]   Medical Record No.  [id number]     Onset Date: 08/12/24 Start of Care Date: 08/15/24     Medical Diagnosis:  Status post CMC arthroplasty and endoscopic carpal tunnel release      OT Treatment Diagnosis:  Impaired self-cares, writing, and household tasks Plan of Treatment  Frequency/Duration:1-2 times per week/8  weeks    Certification date from 08/15/24   To 10/10/24        See note for plan of treatment details and functional goals     Belen Carroll, OT                         I CERTIFY THE NEED FOR THESE SERVICES FURNISHED UNDER        THIS PLAN OF TREATMENT AND WHILE UNDER MY CARE     (Physician attestation of this document indicates review and certification of the therapy plan).              Referring Provider:  Nicolas Jay    Initial Assessment  See Epic Evaluation- 08/15/24

## 2024-08-23 ENCOUNTER — OFFICE VISIT (OUTPATIENT)
Dept: ORTHOPEDICS | Facility: OTHER | Age: 69
End: 2024-08-23
Attending: SPECIALIST
Payer: MEDICARE

## 2024-08-23 DIAGNOSIS — M18.11 PRIMARY OSTEOARTHRITIS OF FIRST CARPOMETACARPAL JOINT OF RIGHT HAND: ICD-10-CM

## 2024-08-23 DIAGNOSIS — G56.01 CARPAL TUNNEL SYNDROME OF RIGHT WRIST: Primary | ICD-10-CM

## 2024-08-23 PROCEDURE — 99024 POSTOP FOLLOW-UP VISIT: CPT | Performed by: SPECIALIST

## 2024-08-23 PROCEDURE — G0463 HOSPITAL OUTPT CLINIC VISIT: HCPCS

## 2024-08-23 NOTE — PROGRESS NOTES
Subjective:    Patient returns for follow-up status post right thumb basal joint reconstruction with endoscopic carpal tunnel release date of surgery was 8/12/2024 she is back to be doing well    Objective:    Examination shows all incisions to be healing nicely no evidence of infection  Imaging:     No new imaging  Assessment:    11 days status post basal joint reconstruction with endoscopic carpal tunnel release on right    Plan:    She will continue her protocol I will see her in 4 weeks to monitor progress sooner if any problems occur.    Nicolas Jay MD

## 2024-08-27 ENCOUNTER — THERAPY VISIT (OUTPATIENT)
Dept: OCCUPATIONAL THERAPY | Facility: OTHER | Age: 69
End: 2024-08-27
Attending: SPECIALIST
Payer: MEDICARE

## 2024-08-27 DIAGNOSIS — M18.11 PRIMARY OSTEOARTHRITIS OF FIRST CARPOMETACARPAL JOINT OF RIGHT HAND: Primary | ICD-10-CM

## 2024-08-27 PROCEDURE — 97763 ORTHC/PROSTC MGMT SBSQ ENC: CPT | Mod: GO | Performed by: OCCUPATIONAL THERAPIST

## 2024-08-27 PROCEDURE — 97035 APP MDLTY 1+ULTRASOUND EA 15: CPT | Mod: GO | Performed by: OCCUPATIONAL THERAPIST

## 2024-09-06 ENCOUNTER — THERAPY VISIT (OUTPATIENT)
Dept: OCCUPATIONAL THERAPY | Facility: OTHER | Age: 69
End: 2024-09-06
Attending: SPECIALIST
Payer: COMMERCIAL

## 2024-09-06 DIAGNOSIS — M18.11 PRIMARY OSTEOARTHRITIS OF FIRST CARPOMETACARPAL JOINT OF RIGHT HAND: Primary | ICD-10-CM

## 2024-09-13 ENCOUNTER — THERAPY VISIT (OUTPATIENT)
Dept: OCCUPATIONAL THERAPY | Facility: OTHER | Age: 69
End: 2024-09-13
Attending: SPECIALIST
Payer: MEDICARE

## 2024-09-13 DIAGNOSIS — M18.11 PRIMARY OSTEOARTHRITIS OF FIRST CARPOMETACARPAL JOINT OF RIGHT HAND: Primary | ICD-10-CM

## 2024-09-25 ENCOUNTER — THERAPY VISIT (OUTPATIENT)
Dept: OCCUPATIONAL THERAPY | Facility: OTHER | Age: 69
End: 2024-09-25
Attending: SPECIALIST
Payer: MEDICARE

## 2024-09-25 DIAGNOSIS — M18.11 PRIMARY OSTEOARTHRITIS OF FIRST CARPOMETACARPAL JOINT OF RIGHT HAND: Primary | ICD-10-CM

## 2024-10-01 ENCOUNTER — THERAPY VISIT (OUTPATIENT)
Dept: OCCUPATIONAL THERAPY | Facility: OTHER | Age: 69
End: 2024-10-01
Attending: SPECIALIST
Payer: MEDICARE

## 2024-10-01 DIAGNOSIS — M18.11 PRIMARY OSTEOARTHRITIS OF FIRST CARPOMETACARPAL JOINT OF RIGHT HAND: Primary | ICD-10-CM

## 2024-10-01 PROCEDURE — 97035 APP MDLTY 1+ULTRASOUND EA 15: CPT | Mod: GO

## 2024-10-01 PROCEDURE — 97110 THERAPEUTIC EXERCISES: CPT | Mod: GO

## 2024-10-01 PROCEDURE — 97140 MANUAL THERAPY 1/> REGIONS: CPT | Mod: GO

## 2024-10-04 ENCOUNTER — THERAPY VISIT (OUTPATIENT)
Dept: OCCUPATIONAL THERAPY | Facility: OTHER | Age: 69
End: 2024-10-04
Attending: SPECIALIST
Payer: MEDICARE

## 2024-10-04 DIAGNOSIS — M18.11 PRIMARY OSTEOARTHRITIS OF FIRST CARPOMETACARPAL JOINT OF RIGHT HAND: Primary | ICD-10-CM

## 2024-10-04 PROCEDURE — 97035 APP MDLTY 1+ULTRASOUND EA 15: CPT | Mod: GO

## 2024-10-04 PROCEDURE — 97110 THERAPEUTIC EXERCISES: CPT | Mod: GO

## 2024-10-04 PROCEDURE — 97140 MANUAL THERAPY 1/> REGIONS: CPT | Mod: GO

## 2024-10-11 ENCOUNTER — THERAPY VISIT (OUTPATIENT)
Dept: OCCUPATIONAL THERAPY | Facility: OTHER | Age: 69
End: 2024-10-11
Attending: SPECIALIST
Payer: MEDICARE

## 2024-10-11 DIAGNOSIS — M18.11 PRIMARY OSTEOARTHRITIS OF FIRST CARPOMETACARPAL JOINT OF RIGHT HAND: Primary | ICD-10-CM

## 2024-10-11 PROCEDURE — 97140 MANUAL THERAPY 1/> REGIONS: CPT | Mod: GO

## 2024-10-11 PROCEDURE — 97035 APP MDLTY 1+ULTRASOUND EA 15: CPT | Mod: GO

## 2024-10-11 PROCEDURE — 97110 THERAPEUTIC EXERCISES: CPT | Mod: GO

## 2024-10-15 ENCOUNTER — THERAPY VISIT (OUTPATIENT)
Dept: OCCUPATIONAL THERAPY | Facility: OTHER | Age: 69
End: 2024-10-15
Attending: SPECIALIST
Payer: MEDICARE

## 2024-10-15 DIAGNOSIS — M18.11 PRIMARY OSTEOARTHRITIS OF FIRST CARPOMETACARPAL JOINT OF RIGHT HAND: Primary | ICD-10-CM

## 2024-10-15 PROCEDURE — 97035 APP MDLTY 1+ULTRASOUND EA 15: CPT | Mod: GO

## 2024-10-15 PROCEDURE — 97110 THERAPEUTIC EXERCISES: CPT | Mod: GO

## 2024-10-15 PROCEDURE — 97140 MANUAL THERAPY 1/> REGIONS: CPT | Mod: GO

## 2024-10-15 NOTE — PROGRESS NOTES
PLAN  Continue therapy per current plan of care.    Beginning/End Dates of Progress Note Reporting Period:  08/15/24 to 10/15/2024    Referring Provider:  Nicolas Jay      10/15/24 0500   Appointment Info   Treating Provider Mgao Tadeo OTR/L   Visits Used 10   Medical Diagnosis Status post CMC arthroplasty and endoscopic carpal tunnel release   OT Tx Diagnosis Impaired self-cares, writing, and household tasks   Precautions/Limitations Per protocol   Progress Note/Certification   Start Of Care Date 08/15/24   Onset of Illness/Injury or Date of Surgery 08/12/24   Therapy Frequency 1-2 times per week   Predicted Duration 8 weeks   Certification date from 08/15/24   Certification date to 10/10/24   KX Modifier Statement I certify the need for these services furnished under this plan of treatment and while under my care.  (Physician co-signature of this document indicates review and certification of the therapy plan)   Progress Note Completed Date 08/15/24   OT Goal 1   Goal Identifier wrist ROM   Goal Description pt will have 100 degrees of PLUNKETT in wrist   Rationale In order to maximize safety and independence with performance of self-care activities   Goal Progress Patient has 90 degrees of PLUNKETT in wrist.   Target Date 11/19/24   OT Goal 2   Goal Identifier hand strength   Goal Description Pt will have  strength with in 5# of left   Rationale In order to maximize safety and independence with performance of self-care activities   Target Date 10/29/24   Goal Progress Will assess  next visit.   OT Goal 3   Goal Identifier fisting   Goal Description Pt will make a composite fist to hold silverware   Rationale In order to maximize safety and independence with performance of self-care activities   Target Date 10/22/24   Date Met 10/15/24   Goal Progress Goal met and D/C.   Subjective Report   Subjective Report Patient reports that the burning sensation is getting a little better.   Objective Measure 1    Objective Measure swelling   Details distal wrist crease: 17.5cm   Objective Measure 2   Objective Measure wrist ROM   Details flex/ext: 24/31   Hot/Cold Packs   Treatment Detail Wrapped heat pack in pillow case and drapped around the hand. Monitored and completed skin checks.   Patient Response/Progress Patient tolerated well.   Ultrasound   Ultrasound, Minutes (86919) 8 Minutes   Treatment Detail pulsed 20% 3.3 Mhz, .8 w/cm2  to thenar for 8 min to decrease swelling and pain.   Patient Response/Progress Patient tolerated well.   Therapeutic Procedure/Exercise   Therapeutic Procedure: strength, endurance, ROM, flexibillity minutes (01800) 16   Ther Proc 1 - Details PROM/AAROM to the thumb and wrist in all planes. Completed light wrist strengthening in all planes with arm on inclined wedge and using 2 pound dumbell 1 set x12 reps. AROM of the thumb in all planes with light resistance in pain free range.   Skilled Intervention Educate in anatomy of injury, progression of exercises, protocol and restrictions,   Patient Response/Progress Patient reportsslight achiness and slight tension with exercises   Manual Therapy   Manual Therapy Minutes (35483) 15   Manual Therapy 1 - Details STM/IASTM to the thumb pad, surrounding tissue and to the forearm where tighness was noted. Educated patient on techniques to complete at home.Completed pressure point massage and educated patient on techniques for completing at home.   Skilled Intervention Education on techniques for home.   Orthotics   Type of Orthotic Neoprene comfort cool CMC Orthosis.   HAND Splinting Finger/Thumb   Wear Schedule Full time   Off for: Hygiene;Exercise   Comments proper fitting for size Medium, educate in wear adn care, donning doffing.   Patient Response/Progress Patient reports comfort with the orthosis   Education   Learner/Method Patient;Listening;Demonstration;Pictures/Video;No Barriers to Learning   Plan   Home program Thumb rolls on ball   Plan  for next session Assess  strength   Total Session Time   Timed Code Treatment Minutes 39   Total Treatment Time (sum of timed and untimed services) 39

## 2024-10-22 ENCOUNTER — THERAPY VISIT (OUTPATIENT)
Dept: OCCUPATIONAL THERAPY | Facility: OTHER | Age: 69
End: 2024-10-22
Attending: SPECIALIST
Payer: MEDICARE

## 2024-10-22 DIAGNOSIS — M18.11 PRIMARY OSTEOARTHRITIS OF FIRST CARPOMETACARPAL JOINT OF RIGHT HAND: Primary | ICD-10-CM

## 2024-10-22 PROCEDURE — 97035 APP MDLTY 1+ULTRASOUND EA 15: CPT | Mod: GO

## 2024-10-22 PROCEDURE — 97140 MANUAL THERAPY 1/> REGIONS: CPT | Mod: GO

## 2024-10-22 PROCEDURE — 97110 THERAPEUTIC EXERCISES: CPT | Mod: GO

## 2024-10-25 ENCOUNTER — THERAPY VISIT (OUTPATIENT)
Dept: OCCUPATIONAL THERAPY | Facility: OTHER | Age: 69
End: 2024-10-25
Attending: SPECIALIST
Payer: MEDICARE

## 2024-10-25 DIAGNOSIS — M18.11 PRIMARY OSTEOARTHRITIS OF FIRST CARPOMETACARPAL JOINT OF RIGHT HAND: Primary | ICD-10-CM

## 2024-10-25 PROCEDURE — 97140 MANUAL THERAPY 1/> REGIONS: CPT | Mod: GO

## 2024-10-25 PROCEDURE — 97110 THERAPEUTIC EXERCISES: CPT | Mod: GO

## 2024-10-25 PROCEDURE — 97035 APP MDLTY 1+ULTRASOUND EA 15: CPT | Mod: GO

## 2025-06-21 DIAGNOSIS — I70.0 ATHEROSCLEROSIS OF AORTA: ICD-10-CM

## 2025-06-21 DIAGNOSIS — E78.5 HYPERLIPIDEMIA LDL GOAL <70: ICD-10-CM

## 2025-06-21 DIAGNOSIS — Z82.49 FAMILY HISTORY OF ISCHEMIC HEART DISEASE: Primary | ICD-10-CM

## 2025-06-21 DIAGNOSIS — Z95.5 STATUS POST INSERTION OF DRUG-ELUTING STENT INTO LEFT ANTERIOR DESCENDING (LAD) ARTERY: ICD-10-CM

## 2025-06-23 RX ORDER — ROSUVASTATIN CALCIUM 20 MG/1
20 TABLET, COATED ORAL AT BEDTIME
Qty: 90 TABLET | Refills: 0 | Status: SHIPPED | OUTPATIENT
Start: 2025-06-23

## 2025-06-23 NOTE — TELEPHONE ENCOUNTER
"Requested Prescriptions   Pending Prescriptions Disp Refills    rosuvastatin (CRESTOR) 20 MG tablet [Pharmacy Med Name: ROSUVASTATIN 20MG TABLETS] 90 tablet 3     Sig: TAKE 1 TABLET(20 MG) BY MOUTH AT BEDTIME       Antihyperlipidemic agents Failed - 6/23/2025 11:47 AM        Failed - LDL on file in the past 12 months        Passed - Medication is active on med list and the sig matches. RN to manually verify dose and sig if red X/fail.     If the protocol passes (green check), you do not need to verify med dose and sig.    A prescription matches if they are the same clinical intention.    For Example: once daily and every morning are the same.    The protocol can not identify upper and lower case letters as matching and will fail.     For Example: Take 1 tablet (50 mg) by mouth daily     TAKE 1 TABLET (50 MG) BY MOUTH DAILY    For all fails (red x), verify dose and sig.    If the refill does match what is on file, the RN can still proceed to approve the refill request.     If they do not match, route to the appropriate provider.        Passed - Recent (12 month) or future (90 days) visit with authorizing provider's specialty (provided they have been seen in the past 15 months)     The patient must have completed an in-person or virtual visit within the past 12 months or has a future visit scheduled within the next 90 days with the authorizing provider s specialty.  Urgent care and e-visits do not qualify as an office visit for this protocol.        Passed - Patient is age 18 years or older        Passed - No active pregnancy on record        Passed - No positive pregnancy test in past 12 mos     Last Written Prescription Date:  6/26/24  Last Fill Quantity: 90,   # refills: 3    Last Office Visit: 6/26/24 and note states: \"Start Crestor 20 mg at bedtime\"    Future Office visit:    Next 5 appointments (look out 90 days)      Jul 01, 2025 11:30 AM  (Arrive by 11:15 AM)  Return Visit with [facility] [address]  [place] [id number] [phone]     Routing refill request to provider for review/approval because:  Failed - LDL on file in the past 12 months    Unable to complete prescription refill per RN Medication Refill Policy.   Mago Ragland RN ....................  6/23/2025   11:47 AM

## 2025-07-01 ENCOUNTER — OFFICE VISIT (OUTPATIENT)
Dept: CARDIOLOGY | Facility: OTHER | Age: 70
End: 2025-07-01
Attending: NURSE PRACTITIONER
Payer: MEDICARE

## 2025-07-01 VITALS
HEIGHT: 61 IN | RESPIRATION RATE: 16 BRPM | SYSTOLIC BLOOD PRESSURE: 122 MMHG | BODY MASS INDEX: 33.3 KG/M2 | HEART RATE: 64 BPM | WEIGHT: 176.4 LBS | OXYGEN SATURATION: 97 % | DIASTOLIC BLOOD PRESSURE: 84 MMHG | TEMPERATURE: 97.2 F

## 2025-07-01 DIAGNOSIS — Z82.49 FAMILY HISTORY OF ISCHEMIC HEART DISEASE: ICD-10-CM

## 2025-07-01 DIAGNOSIS — E78.5 HYPERLIPIDEMIA LDL GOAL <70: ICD-10-CM

## 2025-07-01 DIAGNOSIS — Z95.5 STATUS POST INSERTION OF DRUG-ELUTING STENT INTO LEFT ANTERIOR DESCENDING (LAD) ARTERY: Primary | ICD-10-CM

## 2025-07-01 PROBLEM — I50.21 ACUTE HFREF (HEART FAILURE WITH REDUCED EJECTION FRACTION) (H): Status: RESOLVED | Noted: 2024-01-24 | Resolved: 2025-07-01

## 2025-07-01 LAB
ATRIAL RATE - MUSE: 64 BPM
DIASTOLIC BLOOD PRESSURE - MUSE: NORMAL MMHG
INTERPRETATION ECG - MUSE: NORMAL
P AXIS - MUSE: 49 DEGREES
PR INTERVAL - MUSE: 126 MS
QRS DURATION - MUSE: 80 MS
QT - MUSE: 426 MS
QTC - MUSE: 439 MS
R AXIS - MUSE: 49 DEGREES
SYSTOLIC BLOOD PRESSURE - MUSE: NORMAL MMHG
T AXIS - MUSE: 52 DEGREES
VENTRICULAR RATE- MUSE: 64 BPM

## 2025-07-01 PROCEDURE — G0463 HOSPITAL OUTPT CLINIC VISIT: HCPCS

## 2025-07-01 PROCEDURE — 93005 ELECTROCARDIOGRAM TRACING: CPT | Performed by: NURSE PRACTITIONER

## 2025-07-01 RX ORDER — NITROGLYCERIN 0.4 MG/1
TABLET SUBLINGUAL
Qty: 6 TABLET | Refills: 4 | Status: SHIPPED | OUTPATIENT
Start: 2025-07-01

## 2025-07-01 RX ORDER — ROSUVASTATIN CALCIUM 20 MG/1
20 TABLET, COATED ORAL AT BEDTIME
Qty: 90 TABLET | Refills: 4 | Status: CANCELLED | OUTPATIENT
Start: 2025-07-01

## 2025-07-01 ASSESSMENT — PAIN SCALES - GENERAL: PAINLEVEL_OUTOF10: NO PAIN (0)

## 2025-07-01 NOTE — NURSING NOTE
"Chief Complaint   Patient presents with    Yearly Exam     Cardiac Care        Initial /84 (BP Location: Right arm, Patient Position: Sitting, Cuff Size: Adult Regular)   Pulse 64   Temp 97.2  F (36.2  C) (Tympanic)   Resp 16   Ht 1.549 m (5' 1\")   Wt 80 kg (176 lb 6.4 oz)   LMP 06/26/1995 (Within Months)   SpO2 97%   BMI 33.33 kg/m   Estimated body mass index is 33.33 kg/m  as calculated from the following:    Height as of this encounter: 1.549 m (5' 1\").    Weight as of this encounter: 80 kg (176 lb 6.4 oz).  Medication Review: complete    The next two questions are to help us understand your food security.  If you are feeling you need any assistance in this area, we have resources available to support you today.          1/24/2024   SDOH- Food Insecurity   Within the past 12 months, did you worry that your food would run out before you got money to buy more? N   Within the past 12 months, did the food you bought just not last and you didn t have money to get more? N        Data saved with a previous flowsheet row definition         Health Care Directive:  Patient does not have a Health Care Directive: Discussed advance care planning with patient; however, patient declined at this time.    Maci Bhatti LPN      "

## 2025-07-01 NOTE — PATIENT INSTRUCTIONS
Notify cardiology clinic when you are 4 weeks out from refill of Crestor, we can switch to Lipitor as this may be more cost friendly.   Continue on Aspirin 81 mg daily life long.   Nitroglycerin ordered.  Follow-up with cardiology in 6 months, sooner if needed.

## 2025-07-01 NOTE — PROGRESS NOTES
[facility] HEART CARE   CARDIOLOGY PROGRESS NOTE    Izzy Lopez   [address]  [place] [id number]    Antoinette Acharya    Chief Complaint   Patient presents with    Yearly Exam     Cardiac Care         HPI:   Mrs. Lopez is a 69 year old female who presents for annual cardiology follow-up visit.  S/p coronary angiography on 2/1/2023 resulting in PCI of the LAD.  Patient was initially evaluated by cardiology on 1/18/2023 endorsing chest pain and increased exertion dyspnea. She underwent TTE revealing mildly depressed LVEF with RWMA concerning for ischemic CM.     Patient has a history notable for long standing HLD- has been Rosuvastatin, family history of premature CAD on her dads side of the family, chronic back pain with sciatica, obesity and history of diverticulosis. She does have a history of remote tobacco use for which she quit several years ago.     S/p coronary angiography at [facility] on 2/1/2023 by Dr. Pang.  She was identified to have severe single-vessel CAD, focal 90% stenosis of the pLAD treated with single LINDA (3.0 x 12 mm Synergy).  Discharged on DAPT of aspirin 81 mg daily and Plavix 75 mg daily for optimally 1 year, she will remain on baby ASA life long following LINDA.  Continue on high intensity statin and started on low-dose beta-blocker for cardiac protection.    INTERVAL HISTORY:  Today, patient admits that she has been feeling very good. Occasional brief chest pressure, has not required nitroglycerin use. No increased dyspnea. No palpitations or lightheadedness. Previously discontinued beta-blocker due to bradycardia. She has remained on Rosuvastatin.    RELEVANT TESTING REVIEWED:  Echocardiogram 6/19/2024  Interpretation Summary  Left ventricular size is normal.  Left ventricular function is decreased. The ejection fraction is 50-55%  (borderline).  Right ventricular function, chamber size, wall motion, and thickness are  normal.  Pulmonary artery systolic pressure is  normal.  The inferior vena cava is normal.  No pericardial effusion is present.    Echocardiogram 5/15/2023  Interpretation Summary  Patient has bradycardia and feels dizzy .  Left ventricular wall thickness is normal. Mild left ventricular dilation is  present. The visual ejection fraction is 45-50%. Mild diffuse hypokinesis is  present.  Right ventricular function, chamber size, wall motion, and thickness are  normal.  Trace to mild mitral insufficiency is present. Mild tricuspid insufficiency is  present. The aorta root is normal.  The inferior vena cava was normal in size with preserved respiratory  variability. No pericardial effusion is present.  Compared to prior study, LV function appeas to be the same but inferior WMA is  not readily appreciated on this study.    Echocardiogram 2023  Interpretation Summary  Left ventricular function is mildly reduced. The visually estimated ejection  fraction is 45-50%. Mild diffuse hypokinesis which is slightly more pronounced  in the inferolateral segments. Consider ischemic cardiomyopathy.  Global right ventricular function is normal.  No hemodynamically significant valve abnormalities.  The inferior vena cava is normal.  Normal diameter aortic root and proximal ascending aorta. Large atheroma noted  at the sinotubular junction.  There is no prior study for direct comparison.    PAST MEDICAL HISTORY:   Past Medical History:   Diagnosis Date    Hyperlipidemia     No Comments Provided          FAMILY HISTORY:   Family History   Problem Relation Age of Onset    Cancer Mother 60        Cancer,lung    Other - See Comments Father 60        AAA, during repair    Other - See Comments Paternal Grandmother         GI Disease    Other - See Comments Paternal Grandfather         GI Disease    Breast Cancer Sister         Cancer-breast          PAST SURGICAL HISTORY:   Past Surgical History:   Procedure Laterality Date    COLONOSCOPY  2015    hyperplastic, follow up,  24    HYSTERECTOMY TOTAL ABDOMINAL, BILATERAL SALPINGO-OOPHORECTOMY, COMBINED      No Comments Provided    LAPAROSCOPIC TUBAL LIGATION      No Comments Provided    TONSILLECTOMY      No Comments Provided          SOCIAL HISTORY:   Social History     Socioeconomic History    Marital status:      Spouse name: None    Number of children: None    Years of education: None    Highest education level: None   Tobacco Use    Smoking status: Former     Packs/day: 0.50     Years: 5.00     Pack years: 2.50     Types: Cigarettes     Quit date: 1980     Years since quittin.0    Smokeless tobacco: Former   Vaping Use    Vaping Use: Never used   Substance and Sexual Activity    Alcohol use: No     Alcohol/week: 0.0 standard drinks    Drug use: No     Comment: Drug use: Not Asked    Sexual activity: Yes     Partners: Male   Social History Narrative    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          CURRENT MEDICATIONS:   Current Outpatient Medications   Medication Sig Dispense Refill    aspirin (ASA) 81 MG EC tablet Take 81 mg by mouth daily      fish oil-omega-3 fatty acids 1000 MG capsule Take 1 g by mouth 2 times daily      nitroGLYcerin (NITROSTAT) 0.4 MG sublingual tablet For chest pain place 1 tablet under the tongue every 5 minutes for 3 doses. If symptoms persist 5 minutes after 1st dose call 911. 6 tablet 4    rosuvastatin (CRESTOR) 20 MG tablet TAKE 1 TABLET(20 MG) BY MOUTH AT BEDTIME 90 tablet 0     No current facility-administered medications for this visit.       ALLERGIES:   No Known Allergies     ROS:   CONSTITUTIONAL: No reported fever or chills. No changes in weight.  ENT: No visual disturbance, ear ache, epistaxis or sore throat.   CARDIOVASCULAR: Rare brief chest pressure without radiation.  No palpitations, no edema.   RESPIRATORY: No increased dyspnea.  No cough, wheezing or hemoptysis. No orthopnea or PND.   GI: No reported abdominal pain.  : No reported  "hematuria or dysuria.   NEUROLOGICAL: No dizziness, syncope, ataxia, paresthesias or weakness.   HEMATOLOGIC: No history of anemia. No bleeding or excessive bruising. No history of blood clots.   MUSCULOSKELETAL: No new joint pain or swelling, no muscle pain. Positive for chronic back pain.   ENDOCRINOLOGIC: No temperature intolerance. No hair or skin changes.  SKIN: No abnormal rashes or sores, no unusual itching.      PHYSICAL EXAM:   /84 (BP Location: Right arm, Patient Position: Sitting, Cuff Size: Adult Regular)   Pulse 64   Temp 97.2  F (36.2  C) (Tympanic)   Resp 16   Ht 1.549 m (5' 1\")   Wt 80 kg (176 lb 6.4 oz)   LMP 06/26/1995 (Within Months)   SpO2 97%   BMI 33.33 kg/m    GENERAL: The patient is a well-developed, well-nourished, in no apparent distress.  HEENT: Head is normocephalic and atraumatic. Eyes are symmetrical with normal visual tracking. No icterus, no xanthelasmas. Nares appeared normal without nasal drainage. Mucous membranes are moist, no cyanosis.  NECK: Supple, no cervical bruits, JVP not visible.   CHEST/ LUNGS: Lungs clear to auscultation, no rales, rhonchi or wheezes, no use of accessory muscles, no retractions, respirations unlabored and normal respiratory rate.   CARDIO: Regular rate and rhythm normal with S1 and S2, no S3 or S4 and no murmur, click or rub.   ABD: Abdomen is nondistended.  EXTREMITIES: No edema present.  MUSCULOSKELETAL: No joint swelling.   NEUROLOGIC: Alert and oriented X3. Normal speech, gait and affect. No focal neurologic deficits.   SKIN: No jaundice. No rashes or visible skin lesions present. No ecchymosis.     EKG: NSR, no ST-T changes.     LAB RESULTS:   Office Visit on 07/02/2024   Component Date Value Ref Range Status    Sodium 07/02/2024 138  135 - 145 mmol/L Final    Potassium 07/02/2024 5.3  3.4 - 5.3 mmol/L Final    Carbon Dioxide (CO2) 07/02/2024 21 (L)  22 - 29 mmol/L Final    Anion Gap 07/02/2024 9  7 - 15 mmol/L Final    Urea Nitrogen " 07/02/2024 28.4 (H)  8.0 - 23.0 mg/dL Final    Creatinine 07/02/2024 0.92  0.51 - 0.95 mg/dL Final    GFR Estimate 07/02/2024 67  >60 mL/min/1.73m2 Final    eGFR calculated using 2021 CKD-EPI equation.    Calcium 07/02/2024 9.7  8.8 - 10.2 mg/dL Final    Chloride 07/02/2024 108 (H)  98 - 107 mmol/L Final    Glucose 07/02/2024 94  70 - 99 mg/dL Final    Alkaline Phosphatase 07/02/2024 87  40 - 150 U/L Final    AST 07/02/2024 21  0 - 45 U/L Final    Reference intervals for this test were updated on 6/12/2023 to more accurately reflect our healthy population. There may be differences in the flagging of prior results with similar values performed with this method. Interpretation of those prior results can be made in the context of the updated reference intervals.    ALT 07/02/2024 20  0 - 50 U/L Final    Reference intervals for this test were updated on 6/12/2023 to more accurately reflect our healthy population. There may be differences in the flagging of prior results with similar values performed with this method. Interpretation of those prior results can be made in the context of the updated reference intervals.      Protein Total 07/02/2024 7.3  6.4 - 8.3 g/dL Final    Albumin 07/02/2024 4.3  3.5 - 5.2 g/dL Final    Bilirubin Total 07/02/2024 0.3  <=1.2 mg/dL Final    WBC Count 07/02/2024 10.4  4.0 - 11.0 10e3/uL Final    RBC Count 07/02/2024 4.88  3.80 - 5.20 10e6/uL Final    Hemoglobin 07/02/2024 14.9  11.7 - 15.7 g/dL Final    Hematocrit 07/02/2024 45.6  35.0 - 47.0 % Final    MCV 07/02/2024 93  78 - 100 fL Final    MCH 07/02/2024 30.5  26.5 - 33.0 pg Final    MCHC 07/02/2024 32.7  31.5 - 36.5 g/dL Final    RDW 07/02/2024 13.1  10.0 - 15.0 % Final    Platelet Count 07/02/2024 268  150 - 450 10e3/uL Final    % Neutrophils 07/02/2024 54  % Final    % Lymphocytes 07/02/2024 34  % Final    % Monocytes 07/02/2024 9  % Final    % Eosinophils 07/02/2024 2  % Final    % Basophils 07/02/2024 1  % Final    % Immature  Granulocytes 07/02/2024 0  % Final    NRBCs per 100 WBC 07/02/2024 0  <1 /100 Final    Absolute Neutrophils 07/02/2024 5.7  1.6 - 8.3 10e3/uL Final    Absolute Lymphocytes 07/02/2024 3.6  0.8 - 5.3 10e3/uL Final    Absolute Monocytes 07/02/2024 0.9  0.0 - 1.3 10e3/uL Final    Absolute Eosinophils 07/02/2024 0.2  0.0 - 0.7 10e3/uL Final    Absolute Basophils 07/02/2024 0.1  0.0 - 0.2 10e3/uL Final    Absolute Immature Granulocytes 07/02/2024 0.0  <=0.4 10e3/uL Final    Absolute NRBCs 07/02/2024 0.0  10e3/uL Final         ASSESSMENT:   Izzy Lopez presents for annual cardiology follow-up visit.  S/p coronary angiography on 2/1/2023 resulting in PCI of the LAD.  Patient was initially evaluated by cardiology on 1/18/2023 endorsing chest pain and increased exertion dyspnea. She underwent TTE revealing mildly depressed LVEF with RWMA concerning for ischemic CM.   Today, patient admits that she has been feeling very good. Occasional brief chest pressure, has not required nitroglycerin use. No increased dyspnea. No palpitations or lightheadedness. Previously discontinued beta-blocker due to bradycardia. She has remained on Rosuvastatin.    PLAN:   1. Status post insertion of drug-eluting stent into left anterior descending (LAD) artery- 2/1/2023 ([facility]) (Primary)  -S/p coronary angiography at [facility] on 2/1/2023.  She was identified to have severe single-vessel CAD, focal 90% stenosis of the pLAD treated with single LINDA (3.0 x 12 mm Synergy).    -Rare chest pressure, not progressive or related to exertion. Consider stress test if increased symptoms.   -Continue on ASA 81 mg daily and high intensity statin.   -Patient with history of ischemic CM. Previously reviewed echocardiogram from 5/15/2023 revealing mildly depressed LVEF at 45-50%, mild LV dilation, inferior wall motion abnormality resolved following PCI.  -Last echocardiogram on 6/19/2024 revealing recovered LV systolic function with an LVEF of 55%, no  RWMA's.  LVIDD 5.4 cm.    - nitroGLYcerin (NITROSTAT) 0.4 MG sublingual tablet; For chest pain place 1 tablet under the tongue every 5 minutes for 3 doses. If symptoms persist 5 minutes after 1st dose call 911.  Dispense: 6 tablet; Refill: 4    2. Hyperlipidemia LDL goal <70  -Continue on Crestor 20 mg daily, may adjust to Lipitor if less cost prohibitive.     3. Family history of ischemic heart disease  -Continue with aggressive CAD risk factor optimization.     Follow-up with cardiology in 6 months, sooner if needed.    Orders Placed This Encounter   Procedures    EKG 12-lead, tracing only (Same Day)       Thank you for allowing me to participate in the care of your patient. Please do not hesitate to contact me if you have any questions.     Rebecca Porter, APRN CNP [facility]

## 2025-07-01 NOTE — NURSING NOTE
"Chief Complaint   Patient presents with    Yearly Exam     Cardiac Care        Initial LMP 06/26/1995 (Within Months)  Estimated body mass index is 32.46 kg/m  as calculated from the following:    Height as of 7/2/24: 1.549 m (5' 1\").    Weight as of 7/2/24: 77.9 kg (171 lb 12.8 oz).  Meds Reconciled: complete  Pt is on Aspirin  Pt is on a Statin  Pt is not on Xarelto or Eliquis  Pt is not on a Warfarin   PHQ and/or MAGALY reviewed. Pt referred to PCP/MH Provider as appropriate.    Maci Bhatti LPN       "

## (undated) RX ORDER — BUPIVACAINE HYDROCHLORIDE 5 MG/ML
INJECTION, SOLUTION EPIDURAL; INTRACAUDAL
Status: DISPENSED
Start: 2024-04-11

## (undated) RX ORDER — METHYLPREDNISOLONE ACETATE 40 MG/ML
INJECTION, SUSPENSION INTRA-ARTICULAR; INTRALESIONAL; INTRAMUSCULAR; SOFT TISSUE
Status: DISPENSED
Start: 2024-04-11

## (undated) RX ORDER — LIDOCAINE HYDROCHLORIDE 10 MG/ML
INJECTION, SOLUTION EPIDURAL; INFILTRATION; INTRACAUDAL; PERINEURAL
Status: DISPENSED
Start: 2018-05-05